# Patient Record
Sex: FEMALE | Race: WHITE | NOT HISPANIC OR LATINO | Employment: OTHER | URBAN - METROPOLITAN AREA
[De-identification: names, ages, dates, MRNs, and addresses within clinical notes are randomized per-mention and may not be internally consistent; named-entity substitution may affect disease eponyms.]

---

## 2023-05-16 LAB
ALANINE AMINOTRANSFERASE (SGPT) (U/L) IN SER/PLAS: 14 U/L (ref 7–45)
ALBUMIN (G/DL) IN SER/PLAS: 3.8 G/DL (ref 3.4–5)
ALKALINE PHOSPHATASE (U/L) IN SER/PLAS: 64 U/L (ref 33–136)
ANION GAP IN SER/PLAS: 10 MMOL/L (ref 10–20)
ASPARTATE AMINOTRANSFERASE (SGOT) (U/L) IN SER/PLAS: 17 U/L (ref 9–39)
BILIRUBIN TOTAL (MG/DL) IN SER/PLAS: 0.7 MG/DL (ref 0–1.2)
CALCIUM (MG/DL) IN SER/PLAS: 8.9 MG/DL (ref 8.6–10.3)
CARBON DIOXIDE, TOTAL (MMOL/L) IN SER/PLAS: 29 MMOL/L (ref 21–32)
CHLORIDE (MMOL/L) IN SER/PLAS: 103 MMOL/L (ref 98–107)
CREATININE (MG/DL) IN SER/PLAS: 0.78 MG/DL (ref 0.5–1.05)
ESTIMATED AVERAGE GLUCOSE FOR HBA1C: 123 MG/DL
GFR FEMALE: 82 ML/MIN/1.73M2
GLUCOSE (MG/DL) IN SER/PLAS: 93 MG/DL (ref 74–99)
HEMOGLOBIN A1C/HEMOGLOBIN TOTAL IN BLOOD: 5.9 %
POTASSIUM (MMOL/L) IN SER/PLAS: 4.5 MMOL/L (ref 3.5–5.3)
PROTEIN TOTAL: 6.8 G/DL (ref 6.4–8.2)
SODIUM (MMOL/L) IN SER/PLAS: 137 MMOL/L (ref 136–145)
UREA NITROGEN (MG/DL) IN SER/PLAS: 18 MG/DL (ref 6–23)

## 2023-05-23 ENCOUNTER — OFFICE VISIT (OUTPATIENT)
Dept: PRIMARY CARE | Facility: CLINIC | Age: 68
End: 2023-05-23
Payer: MEDICARE

## 2023-05-23 VITALS
WEIGHT: 137 LBS | TEMPERATURE: 97.1 F | HEART RATE: 80 BPM | OXYGEN SATURATION: 98 % | SYSTOLIC BLOOD PRESSURE: 140 MMHG | HEIGHT: 64 IN | DIASTOLIC BLOOD PRESSURE: 80 MMHG | BODY MASS INDEX: 23.39 KG/M2

## 2023-05-23 DIAGNOSIS — R73.03 PREDIABETES: ICD-10-CM

## 2023-05-23 DIAGNOSIS — Z00.00 ROUTINE GENERAL MEDICAL EXAMINATION AT HEALTH CARE FACILITY: Primary | ICD-10-CM

## 2023-05-23 DIAGNOSIS — R59.9 ENLARGED LYMPH NODES, UNSPECIFIED: ICD-10-CM

## 2023-05-23 DIAGNOSIS — N18.2 CKD (CHRONIC KIDNEY DISEASE) STAGE 2, GFR 60-89 ML/MIN: ICD-10-CM

## 2023-05-23 PROCEDURE — G0439 PPPS, SUBSEQ VISIT: HCPCS | Performed by: FAMILY MEDICINE

## 2023-05-23 PROCEDURE — 99397 PER PM REEVAL EST PAT 65+ YR: CPT | Performed by: FAMILY MEDICINE

## 2023-05-23 PROCEDURE — 1170F FXNL STATUS ASSESSED: CPT | Performed by: FAMILY MEDICINE

## 2023-05-23 PROCEDURE — G0444 DEPRESSION SCREEN ANNUAL: HCPCS | Performed by: FAMILY MEDICINE

## 2023-05-23 PROCEDURE — 1036F TOBACCO NON-USER: CPT | Performed by: FAMILY MEDICINE

## 2023-05-23 PROCEDURE — 1160F RVW MEDS BY RX/DR IN RCRD: CPT | Performed by: FAMILY MEDICINE

## 2023-05-23 PROCEDURE — G0446 INTENS BEHAVE THER CARDIO DX: HCPCS | Performed by: FAMILY MEDICINE

## 2023-05-23 PROCEDURE — 99214 OFFICE O/P EST MOD 30 MIN: CPT | Performed by: FAMILY MEDICINE

## 2023-05-23 PROCEDURE — 1159F MED LIST DOCD IN RCRD: CPT | Performed by: FAMILY MEDICINE

## 2023-05-23 RX ORDER — LATANOPROST 50 UG/ML
SOLUTION/ DROPS OPHTHALMIC
COMMUNITY

## 2023-05-23 RX ORDER — BIMATOPROST 0.3 MG/ML
SOLUTION/ DROPS OPHTHALMIC
COMMUNITY
Start: 2020-11-19 | End: 2023-11-20 | Stop reason: WASHOUT

## 2023-05-23 RX ORDER — ACETAMINOPHEN 500 MG
1 TABLET ORAL DAILY
COMMUNITY

## 2023-05-23 RX ORDER — HYDROCORTISONE 25 MG/G
CREAM TOPICAL
COMMUNITY
Start: 2022-08-09 | End: 2024-03-07 | Stop reason: WASHOUT

## 2023-05-23 RX ORDER — TIMOLOL MALEATE 5 MG/ML
SOLUTION/ DROPS OPHTHALMIC
COMMUNITY
Start: 2023-04-21 | End: 2023-11-20 | Stop reason: WASHOUT

## 2023-05-23 RX ORDER — LORATADINE 10 MG/1
TABLET ORAL
COMMUNITY

## 2023-05-23 ASSESSMENT — ENCOUNTER SYMPTOMS
VOMITING: 0
POLYDIPSIA: 0
CONSTIPATION: 0
DIZZINESS: 0
ARTHRALGIAS: 0
SHORTNESS OF BREATH: 0
SLEEP DISTURBANCE: 0
PALPITATIONS: 0
DYSURIA: 0
POLYPHAGIA: 0
DYSPHORIC MOOD: 0
FATIGUE: 0
DIFFICULTY URINATING: 0
BLOOD IN STOOL: 0
ABDOMINAL DISTENTION: 0
ABDOMINAL PAIN: 0
NAUSEA: 0
HEADACHES: 0
DIARRHEA: 0
MYALGIAS: 0

## 2023-05-23 ASSESSMENT — PATIENT HEALTH QUESTIONNAIRE - PHQ9
2. FEELING DOWN, DEPRESSED OR HOPELESS: NOT AT ALL
1. LITTLE INTEREST OR PLEASURE IN DOING THINGS: NOT AT ALL
SUM OF ALL RESPONSES TO PHQ9 QUESTIONS 1 AND 2: 0

## 2023-05-23 ASSESSMENT — ACTIVITIES OF DAILY LIVING (ADL)
TAKING_MEDICATION: INDEPENDENT
DRESSING: INDEPENDENT
BATHING: INDEPENDENT
DOING_HOUSEWORK: INDEPENDENT
GROCERY_SHOPPING: INDEPENDENT
MANAGING_FINANCES: INDEPENDENT

## 2023-05-23 NOTE — PROGRESS NOTES
"Subjective   Reason for Visit: Tere Rod is an 67 y.o. female here for a Medicare Wellness visit, CPE and multiple issues     Past Medical, Surgical, and Family History reviewed and updated in chart.         HPI    Patient Care Team:  Zenia Carney DO as PCP - General  Zenia Carney DO as PCP - [employer] PCP    Surgery: Dr. Mathur    Predm: stable. A1c 5.9%.   BMI: nml.   LAD: Reports intermittent swelling in the neck.  Occasionally feels fullness throat but denies GERD symptoms.  No recent illness.  No associated fever/chills/weight change/appetite change.  CKD: stable. Cr nml, GFR 82.     Review of Systems   Constitutional:  Negative for fatigue.   HENT: Negative.     Eyes:  Negative for visual disturbance.   Respiratory:  Negative for shortness of breath.    Cardiovascular:  Negative for chest pain and palpitations.   Gastrointestinal:  Negative for abdominal distention, abdominal pain, blood in stool, constipation, diarrhea, nausea and vomiting.   Endocrine: Negative for cold intolerance, heat intolerance, polydipsia, polyphagia and polyuria.   Genitourinary:  Negative for difficulty urinating and dysuria.   Musculoskeletal:  Negative for arthralgias and myalgias.   Skin:  Negative for rash.   Neurological:  Negative for dizziness and headaches.   Psychiatric/Behavioral:  Negative for dysphoric mood and sleep disturbance.        Objective   Vitals:  /80   Pulse 80   Temp 36.2 °C (97.1 °F)   Ht 1.626 m (5' 4\")   Wt 62.1 kg (137 lb)   SpO2 98%   BMI 23.52 kg/m²       Physical Exam  Vitals and nursing note reviewed.   Constitutional:       General: She is not in acute distress.     Appearance: Normal appearance. She is not toxic-appearing or diaphoretic.   HENT:      Head: Normocephalic.      Right Ear: Tympanic membrane normal.      Left Ear: Tympanic membrane normal.      Nose: Nose normal.      Mouth/Throat:      Pharynx: Oropharynx is clear.   Eyes:      General: No scleral " icterus.     Pupils: Pupils are equal, round, and reactive to light.   Neck:      Vascular: No carotid bruit.   Cardiovascular:      Rate and Rhythm: Normal rate and regular rhythm.      Pulses: Normal pulses.      Heart sounds: No murmur heard.  Pulmonary:      Effort: Pulmonary effort is normal. No respiratory distress.      Breath sounds: Normal breath sounds.   Abdominal:      General: Bowel sounds are normal.      Palpations: Abdomen is soft.      Tenderness: There is no abdominal tenderness. There is no guarding.   Musculoskeletal:      Cervical back: Neck supple.      Right lower leg: No edema.      Left lower leg: No edema.   Lymphadenopathy:      Head:      Right side of head: No posterior auricular or occipital adenopathy.      Left side of head: No posterior auricular or occipital adenopathy.      Cervical: No cervical adenopathy.      Right cervical: No superficial or posterior cervical adenopathy.     Left cervical: No superficial or posterior cervical adenopathy.      Upper Body:      Right upper body: No supraclavicular adenopathy.      Left upper body: No supraclavicular adenopathy.   Skin:     General: Skin is warm.   Neurological:      General: No focal deficit present.      Mental Status: She is alert.      Cranial Nerves: No cranial nerve deficit.   Psychiatric:         Mood and Affect: Mood normal.         Assessment/Plan   Problem List Items Addressed This Visit    None  Visit Diagnoses       Routine general medical examination at health care facility    -  Primary    Relevant Orders    Lipid Panel    Prediabetes        Relevant Orders    Comprehensive Metabolic Panel    Hemoglobin A1C    Enlarged lymph nodes, unspecified        Relevant Orders    CBC and Auto Differential    CKD (chronic kidney disease) stage 2, GFR 60-89 ml/min            Discussed blood work and wellness issues.  Reviewed screening    ASCVD risk counseling: Discussed risk of 9%.  Reviewed prior laboratory work.  Patient  declines statin.  Aspirin not indicated at this time.  Discussed lifestyle changes to improve cardiovascular risk.  Patient would like to continue to work on lifestyle and consider statin at next visit if lipids still high    Colonoscopy: done 1/4/23.

## 2023-05-23 NOTE — PATIENT INSTRUCTIONS
Recommend a predominant whole foods plant based diet.  Cut back on meat, dairy, salt and oils. Increase fiber in your diet.  Decrease alcohol as much as possible if you drink. Recommend regular exercise most days of the week.    Recommend shingrix vaccine at the pharmacy    Follow up in 3 months with repeat blood work, sooner if needed

## 2023-05-31 ENCOUNTER — TELEPHONE (OUTPATIENT)
Dept: PRIMARY CARE | Facility: CLINIC | Age: 68
End: 2023-05-31
Payer: MEDICARE

## 2023-05-31 NOTE — TELEPHONE ENCOUNTER
Patient states she tried to make an appt and [facility] does not have an opening until June 26. The Lymph Nodules are back down again. Her Fit Bit showed her Heart Rate she was in Afib and her BP has been 88/47,85/58. Please Advise

## 2023-08-15 ENCOUNTER — LAB (OUTPATIENT)
Dept: LAB | Facility: LAB | Age: 68
End: 2023-08-15
Payer: MEDICARE

## 2023-08-15 DIAGNOSIS — R59.9 ENLARGED LYMPH NODES, UNSPECIFIED: ICD-10-CM

## 2023-08-15 LAB
BASOPHILS (10*3/UL) IN BLOOD BY AUTOMATED COUNT: 0.04 X10E9/L (ref 0–0.1)
BASOPHILS/100 LEUKOCYTES IN BLOOD BY AUTOMATED COUNT: 0.9 % (ref 0–2)
EOSINOPHILS (10*3/UL) IN BLOOD BY AUTOMATED COUNT: 0.09 X10E9/L (ref 0–0.7)
EOSINOPHILS/100 LEUKOCYTES IN BLOOD BY AUTOMATED COUNT: 2 % (ref 0–6)
ERYTHROCYTE DISTRIBUTION WIDTH (RATIO) BY AUTOMATED COUNT: 12.8 % (ref 11.5–14.5)
ERYTHROCYTE MEAN CORPUSCULAR HEMOGLOBIN CONCENTRATION (G/DL) BY AUTOMATED: 33.2 G/DL (ref 32–36)
ERYTHROCYTE MEAN CORPUSCULAR VOLUME (FL) BY AUTOMATED COUNT: 92 FL (ref 80–100)
ERYTHROCYTES (10*6/UL) IN BLOOD BY AUTOMATED COUNT: 4.42 X10E12/L (ref 4–5.2)
HEMATOCRIT (%) IN BLOOD BY AUTOMATED COUNT: 40.7 % (ref 36–46)
HEMOGLOBIN (G/DL) IN BLOOD: 13.5 G/DL (ref 12–16)
IMMATURE GRANULOCYTES/100 LEUKOCYTES IN BLOOD BY AUTOMATED COUNT: 0.2 % (ref 0–0.9)
LEUKOCYTES (10*3/UL) IN BLOOD BY AUTOMATED COUNT: 4.6 X10E9/L (ref 4.4–11.3)
LYMPHOCYTES (10*3/UL) IN BLOOD BY AUTOMATED COUNT: 1.42 X10E9/L (ref 1.2–4.8)
LYMPHOCYTES/100 LEUKOCYTES IN BLOOD BY AUTOMATED COUNT: 31.1 % (ref 13–44)
MONOCYTES (10*3/UL) IN BLOOD BY AUTOMATED COUNT: 0.36 X10E9/L (ref 0.1–1)
MONOCYTES/100 LEUKOCYTES IN BLOOD BY AUTOMATED COUNT: 7.9 % (ref 2–10)
NEUTROPHILS (10*3/UL) IN BLOOD BY AUTOMATED COUNT: 2.64 X10E9/L (ref 1.2–7.7)
NEUTROPHILS/100 LEUKOCYTES IN BLOOD BY AUTOMATED COUNT: 57.9 % (ref 40–80)
PLATELETS (10*3/UL) IN BLOOD AUTOMATED COUNT: 268 X10E9/L (ref 150–450)

## 2023-08-15 PROCEDURE — 85025 COMPLETE CBC W/AUTO DIFF WBC: CPT

## 2023-08-15 PROCEDURE — 36415 COLL VENOUS BLD VENIPUNCTURE: CPT

## 2023-08-23 ENCOUNTER — TELEPHONE (OUTPATIENT)
Dept: PRIMARY CARE | Facility: CLINIC | Age: 68
End: 2023-08-23

## 2023-08-23 ENCOUNTER — OFFICE VISIT (OUTPATIENT)
Dept: PRIMARY CARE | Facility: CLINIC | Age: 68
End: 2023-08-23
Payer: MEDICARE

## 2023-08-23 VITALS
HEART RATE: 72 BPM | DIASTOLIC BLOOD PRESSURE: 80 MMHG | OXYGEN SATURATION: 98 % | BODY MASS INDEX: 21.97 KG/M2 | TEMPERATURE: 97.4 F | SYSTOLIC BLOOD PRESSURE: 122 MMHG | WEIGHT: 128 LBS

## 2023-08-23 DIAGNOSIS — R73.03 PREDIABETES: ICD-10-CM

## 2023-08-23 DIAGNOSIS — E04.2 MULTIPLE THYROID NODULES: ICD-10-CM

## 2023-08-23 DIAGNOSIS — R07.9 CHEST PAIN, UNSPECIFIED TYPE: ICD-10-CM

## 2023-08-23 DIAGNOSIS — K21.9 GASTROESOPHAGEAL REFLUX DISEASE WITHOUT ESOPHAGITIS: Primary | ICD-10-CM

## 2023-08-23 DIAGNOSIS — F43.22 ADJUSTMENT DISORDER WITH ANXIETY: ICD-10-CM

## 2023-08-23 PROBLEM — E55.9 VITAMIN D DEFICIENCY: Status: ACTIVE | Noted: 2023-08-23

## 2023-08-23 PROBLEM — J30.9 ALLERGIC RHINITIS: Status: ACTIVE | Noted: 2018-10-10

## 2023-08-23 PROBLEM — J37.0 CHRONIC LARYNGITIS: Status: ACTIVE | Noted: 2023-08-02

## 2023-08-23 PROBLEM — E53.8 VITAMIN B12 DEFICIENCY: Status: ACTIVE | Noted: 2023-08-23

## 2023-08-23 PROCEDURE — 1126F AMNT PAIN NOTED NONE PRSNT: CPT | Performed by: FAMILY MEDICINE

## 2023-08-23 PROCEDURE — 1036F TOBACCO NON-USER: CPT | Performed by: FAMILY MEDICINE

## 2023-08-23 PROCEDURE — 99214 OFFICE O/P EST MOD 30 MIN: CPT | Performed by: FAMILY MEDICINE

## 2023-08-23 PROCEDURE — 93000 ELECTROCARDIOGRAM COMPLETE: CPT | Performed by: FAMILY MEDICINE

## 2023-08-23 PROCEDURE — 1160F RVW MEDS BY RX/DR IN RCRD: CPT | Performed by: FAMILY MEDICINE

## 2023-08-23 PROCEDURE — 1159F MED LIST DOCD IN RCRD: CPT | Performed by: FAMILY MEDICINE

## 2023-08-23 RX ORDER — ESTRADIOL 0.1 MG/G
CREAM VAGINAL
COMMUNITY
Start: 2021-01-05 | End: 2023-11-28 | Stop reason: SDUPTHER

## 2023-08-23 RX ORDER — SERTRALINE HYDROCHLORIDE 50 MG/1
50 TABLET, FILM COATED ORAL DAILY
Qty: 90 TABLET | Refills: 0 | Status: SHIPPED | OUTPATIENT
Start: 2023-08-23 | End: 2023-11-20 | Stop reason: WASHOUT

## 2023-08-23 RX ORDER — OMEPRAZOLE 40 MG/1
40 CAPSULE, DELAYED RELEASE ORAL DAILY
COMMUNITY

## 2023-08-23 RX ORDER — HYDROXYZINE HYDROCHLORIDE 10 MG/1
10 TABLET, FILM COATED ORAL 2 TIMES DAILY PRN
Qty: 30 TABLET | Refills: 0 | Status: SHIPPED | OUTPATIENT
Start: 2023-08-23 | End: 2023-09-08 | Stop reason: SDUPTHER

## 2023-08-23 ASSESSMENT — ENCOUNTER SYMPTOMS
DYSPHORIC MOOD: 0
SHORTNESS OF BREATH: 0
SLEEP DISTURBANCE: 0
HEADACHES: 0
POLYDIPSIA: 0
DIFFICULTY URINATING: 0
ARTHRALGIAS: 0
FATIGUE: 0
MYALGIAS: 0
CONSTIPATION: 0
POLYPHAGIA: 0
DIZZINESS: 0
VOMITING: 0
NAUSEA: 0
DIARRHEA: 0
ABDOMINAL PAIN: 0
DYSURIA: 0

## 2023-08-23 NOTE — PATIENT INSTRUCTIONS
Obtain blood (thyroid blood work)    Start new meds. Stop if having significant side effects. Continue your other medications    Follow up with your specialists as scheduled    Go to the ER if any of your symptoms are significantly worsening.     Follow up in 3 months, sooner if needed

## 2023-08-23 NOTE — PROGRESS NOTES
Subjective   Patient ID: Tere Rod is a 67 y.o. female who presents for GERD and Prediabetes and multiple issues    GERD  She reports no abdominal pain or no nausea. Pertinent negatives include no fatigue.      MNG: saw ENT after last ov. Scope showed chronic reflux. US ordered and showed multiple small nodules. Radiology recommended TFTs    GERD: has pain and fullness in neck. Saw ENT as above. Placed on PPI. Sig improved.    Mood: feeling more anxious. Treated for anxiety in past at age 38 w/ prozac. On edge and occ overwhelmed. Sleeping ok. Mood affecting daily life. Would like to start med    CP: occ pressure in chest. Random. Mostly when moving around. Occ flutter but brief. No radiation of pain    Review of Systems   Constitutional:  Negative for fatigue.   HENT:          As above   Eyes:  Negative for visual disturbance.   Respiratory:  Negative for shortness of breath.    Cardiovascular:         As above   Gastrointestinal:  Negative for abdominal pain, constipation, diarrhea, nausea and vomiting.   Endocrine: Negative for polydipsia, polyphagia and polyuria.   Genitourinary:  Negative for difficulty urinating and dysuria.   Musculoskeletal:  Negative for arthralgias and myalgias.   Skin:  Negative for rash.   Neurological:  Negative for dizziness and headaches.   Psychiatric/Behavioral:  Negative for dysphoric mood and sleep disturbance.        Objective   /80   Pulse 72   Temp 36.3 °C (97.4 °F)   Wt 58.1 kg (128 lb)   SpO2 98%   BMI 21.97 kg/m²     Physical Exam  Vitals and nursing note reviewed.   Constitutional:       General: She is not in acute distress.     Appearance: Normal appearance. She is not toxic-appearing.   HENT:      Head: Normocephalic.   Eyes:      Pupils: Pupils are equal, round, and reactive to light.   Cardiovascular:      Rate and Rhythm: Normal rate and regular rhythm.      Pulses: Normal pulses.      Heart sounds: No murmur heard.  Pulmonary:      Effort: Pulmonary  effort is normal. No respiratory distress.      Breath sounds: Normal breath sounds.   Abdominal:      General: Bowel sounds are normal.      Palpations: Abdomen is soft.      Tenderness: There is no abdominal tenderness. There is no guarding.   Musculoskeletal:      Right lower leg: No edema.      Left lower leg: No edema.   Skin:     General: Skin is warm.   Neurological:      General: No focal deficit present.      Mental Status: She is alert.      Cranial Nerves: No cranial nerve deficit.   Psychiatric:         Mood and Affect: Mood normal.       Assessment/Plan   Problem List Items Addressed This Visit          Endocrine/Metabolic    Multiple thyroid nodules    Relevant Orders    TSH with reflex to Free T4 if abnormal    Prediabetes       Gastrointestinal and Abdominal    Gastroesophageal reflux disease without esophagitis - Primary     Other Visit Diagnoses       Adjustment disorder with anxiety        Chest pain, unspecified type        Relevant Orders    ECG 12 lead        Reviewed consults, bw and imaging.

## 2023-08-29 ENCOUNTER — LAB (OUTPATIENT)
Dept: LAB | Facility: LAB | Age: 68
End: 2023-08-29
Payer: MEDICARE

## 2023-08-29 DIAGNOSIS — E04.2 MULTIPLE THYROID NODULES: ICD-10-CM

## 2023-08-29 LAB — THYROTROPIN (MIU/L) IN SER/PLAS BY DETECTION LIMIT <= 0.05 MIU/L: 2.28 MIU/L (ref 0.44–3.98)

## 2023-08-29 PROCEDURE — 36415 COLL VENOUS BLD VENIPUNCTURE: CPT

## 2023-08-29 PROCEDURE — 84443 ASSAY THYROID STIM HORMONE: CPT

## 2023-09-05 ENCOUNTER — TELEPHONE (OUTPATIENT)
Dept: PRIMARY CARE | Facility: CLINIC | Age: 68
End: 2023-09-05
Payer: MEDICARE

## 2023-09-08 DIAGNOSIS — F43.22 ADJUSTMENT DISORDER WITH ANXIETY: ICD-10-CM

## 2023-09-08 RX ORDER — HYDROXYZINE HYDROCHLORIDE 10 MG/1
10 TABLET, FILM COATED ORAL 2 TIMES DAILY PRN
Qty: 60 TABLET | Refills: 0 | Status: SHIPPED | OUTPATIENT
Start: 2023-09-08 | End: 2023-11-28 | Stop reason: SDUPTHER

## 2023-09-08 NOTE — TELEPHONE ENCOUNTER
Pt called rx line at 925 requesting a refill on pended med.  Pt last RX was sent on 8/23 for a QTY of 30.  Pt is asking if a 90 day supply of med could be sent to [facility] (in EMR).  Please advise? Thanks, CG

## 2023-11-18 DIAGNOSIS — F43.22 ADJUSTMENT DISORDER WITH ANXIETY: ICD-10-CM

## 2023-11-20 ENCOUNTER — OFFICE VISIT (OUTPATIENT)
Dept: GASTROENTEROLOGY | Facility: CLINIC | Age: 68
End: 2023-11-20
Payer: MEDICARE

## 2023-11-20 VITALS
BODY MASS INDEX: 23.21 KG/M2 | HEIGHT: 63 IN | DIASTOLIC BLOOD PRESSURE: 84 MMHG | SYSTOLIC BLOOD PRESSURE: 138 MMHG | HEART RATE: 72 BPM | WEIGHT: 131 LBS

## 2023-11-20 DIAGNOSIS — K21.9 GASTROESOPHAGEAL REFLUX DISEASE WITHOUT ESOPHAGITIS: Primary | ICD-10-CM

## 2023-11-20 PROCEDURE — 1036F TOBACCO NON-USER: CPT | Performed by: INTERNAL MEDICINE

## 2023-11-20 PROCEDURE — 1160F RVW MEDS BY RX/DR IN RCRD: CPT | Performed by: INTERNAL MEDICINE

## 2023-11-20 PROCEDURE — 99204 OFFICE O/P NEW MOD 45 MIN: CPT | Performed by: INTERNAL MEDICINE

## 2023-11-20 PROCEDURE — 1126F AMNT PAIN NOTED NONE PRSNT: CPT | Performed by: INTERNAL MEDICINE

## 2023-11-20 PROCEDURE — 1159F MED LIST DOCD IN RCRD: CPT | Performed by: INTERNAL MEDICINE

## 2023-11-20 ASSESSMENT — ENCOUNTER SYMPTOMS
SHORTNESS OF BREATH: 0
ARTHRALGIAS: 0
ADENOPATHY: 0
NUMBNESS: 0
WHEEZING: 0
CHILLS: 0
BRUISES/BLEEDS EASILY: 0
TROUBLE SWALLOWING: 0
WEAKNESS: 0
PALPITATIONS: 0
SORE THROAT: 0
COUGH: 0
DYSURIA: 0
VOICE CHANGE: 0
UNEXPECTED WEIGHT CHANGE: 0
NERVOUS/ANXIOUS: 1
MYALGIAS: 0
FEVER: 0
FATIGUE: 0
CONFUSION: 0
SEIZURES: 0
HEADACHES: 0
HEMATURIA: 0
DIZZINESS: 0
ROS GI COMMENTS: AS DETAILED ABOVE.

## 2023-11-20 NOTE — LETTER
November 20, 2023     Silke Christianson MD  [address]  [address]  [place] [address]    Patient: Tere Rod   YOB: 1955   Date of Visit: 11/20/2023       Dear Dr. Silke Christianson MD:    Thank you for referring Tere Rod to me for evaluation. Below are my notes for this consultation.  If you have questions, please do not hesitate to call me. I look forward to following your patient along with you.       Sincerely,     Attila Kramer MD      CC: Zenia Carney DO  ______________________________________________________________________________________        [facility]    ASSESSMENT and PLAN:       Tere Rod is a 68 y.o. female with a significant past medical history of prediabetes and GERD who presents for consultation requested by ENT (Dr. Silke Christianson) for the evaluation of GERD.       GERD  Longstanding symptoms of GERD with some worsening recently as well as dysphagia. She is doing better on a PPI. Imaging (XR esophagram) shows no stenosis or stricture. C5 osteophyte may be contributing to symptoms of dysphagia. There is a family history of esophageal cancer (and likely BE). Given dysphagia and family history will plan for EGD at this time.  - continue PPI  - EGD scheduled in endo      Follow up in 6 months.        Attila Kramer MD        Gastroenterology    [facility] [place] [facility]    Clinical   [facility]        Subjective  HISTORY OF PRESENT ILLNESS:     Chief Complaint  GERD    History Of Present Illness:    Tere Rod is a 68 y.o. female with a significant past medical history of prediabetes and GERD who presents for consultation requested by ENT (Dr. Silke Christianson) for the evaluation of GERD.    She follows with Zenia Carney DO as her PCP.       Her medication list includes Prilosec (40 mg daily).    She was previously seen by ENT (only limited records  available to review) and then seen by her PCP who reported that ENT “scope showed chronic reflux”.    Labs have shown a normal CBC, CMP, and TSH.    XR esophagram on 8/17/2023 was normal except for a small hiatal hernia. MBBS did show a prominent cricopharyngeal impression at C5.      Today she says that she saw Dr. Christianson because of difficulty swallowing. He had started her on Omeprzole and with that she has noticed that her symptoms are 95-98% better.    Overall she has had issues with heartburn for years with burning in her chest and regurgitation/sour taste in her mouth. In the past this had been intermittent, but had worsened recently. In the past lifestyle changes had helped.    Prior to seeing Dr. Christianson she was having dysphagia with solids/pills. She noticed difficulty starting a swallow and a sensation of food sitting in the back of her mouth. Once she started a swallow she did not notice any sticking/stopping.    She has been taking Omeprazole daily (after dinner).    She says that her last EGD was about 8 years ago and she thinks it was normal.      Patient denies any N/V, odynophagia, abdominal pain, diarrhea, constipation, hematemesis, hematochezia, melena, or weight loss.      Review of systems:   Review of Systems   Constitutional:  Negative for chills, fatigue, fever and unexpected weight change.   HENT:  Negative for congestion, sneezing, sore throat, trouble swallowing and voice change.    Respiratory:  Negative for cough, shortness of breath and wheezing.    Cardiovascular:  Negative for chest pain, palpitations and leg swelling.   Gastrointestinal:         As detailed above.   Genitourinary:  Negative for dysuria and hematuria.   Musculoskeletal:  Negative for arthralgias and myalgias.   Skin:  Negative for pallor and rash.   Neurological:  Negative for dizziness, seizures, syncope, weakness, numbness and headaches.   Hematological:  Negative for adenopathy. Does not bruise/bleed easily.    Psychiatric/Behavioral:  Negative for confusion. The patient is nervous/anxious.    All other systems reviewed and are negative.      I performed a complete 10 point review of systems and it is negative except as noted in HPI or above.      PAST HISTORIES:       Past Medical History:  She has a past medical history of Cystocele, unspecified (CODE) (02/05/2019), Encounter for follow-up examination after completed treatment for conditions other than malignant neoplasm (09/29/2020), Other conditions influencing health status (09/29/2020), Personal history of cervical dysplasia (11/19/2020), Personal history of gestational diabetes (10/05/2020), Personal history of other (healed) physical injury and trauma (01/24/2019), Personal history of other diseases of urinary system, Personal history of other specified conditions (01/22/2020), Urinary tract infection, site not specified (08/25/2020), and Uterovaginal prolapse, unspecified (09/29/2020).    Past Surgical History:  She has a past surgical history that includes Other surgical history (12/27/2018); Other surgical history (12/27/2018); Other surgical history (11/22/2021); Other surgical history (09/02/2020); Other surgical history (09/02/2020); and Other surgical history (09/02/2020).      Social History:  She reports that she has never smoked. She has never used smokeless tobacco. She reports that she does not currently use alcohol. She reports that she does not use drugs.    Family History:  She reports that her father had esophageal cancer. She thinks that he may have had Daigle's esophagus initially.       Family History   Problem Relation Name Age of Onset   • Diabetes Mother     • Hypertension Mother     • Colon cancer Mother     • Diabetes Father     • Hypertension Father     • Coronary artery disease Father     • Heart attack Father     • Esophageal cancer Father     • Diabetes Brother     • Hypertension Brother         "  Allergies:  Other      Objective  OBJECTIVE:       Last Recorded Vitals:  Vitals:    11/20/23 1535   BP: 138/84   Pulse: 72   Weight: 59.4 kg (131 lb)   Height: 1.6 m (5' 3\")     /84   Pulse 72   Ht 1.6 m (5' 3\")   Wt 59.4 kg (131 lb)   BMI 23.21 kg/m²      Physical Exam:    Physical Exam  Vitals reviewed.   Constitutional:       General: She is not in acute distress.     Appearance: She is not ill-appearing.   HENT:      Head: Normocephalic and atraumatic.   Eyes:      General: No scleral icterus.  Cardiovascular:      Rate and Rhythm: Normal rate and regular rhythm.      Pulses: Normal pulses.      Heart sounds: Normal heart sounds. No murmur heard.  Pulmonary:      Effort: Pulmonary effort is normal. No respiratory distress.      Breath sounds: Normal breath sounds. No wheezing.   Abdominal:      General: Bowel sounds are normal.      Palpations: Abdomen is soft.      Tenderness: There is no abdominal tenderness. There is no rebound.   Musculoskeletal:         General: No swelling or deformity.   Skin:     General: Skin is warm and dry.      Coloration: Skin is not jaundiced.      Findings: No rash.   Neurological:      General: No focal deficit present.      Mental Status: She is alert and oriented to person, place, and time.   Psychiatric:         Mood and Affect: Mood normal.         Behavior: Behavior normal.         Thought Content: Thought content normal.         Judgment: Judgment normal.         Home Medications:  Prior to Admission medications    Medication Sig Start Date End Date Taking? Authorizing Provider   Anusol-HC 2.5 % rectal cream Apply topically. 8/9/22   Historical Provider, MD   bimatoprost (Lumigan) 0.03 % ophthalmic solution Administer into affected eye(s). 11/19/20   Historical Provider, MD   cholecalciferol (Vitamin D-3) 5,000 Units tablet Take 1 tablet (5,000 Units) by mouth once daily.    Historical Provider, MD   estradiol (Estrace) 0.01 % (0.1 mg/gram) vaginal cream PLACE " "DIME SIZED DROP OF CREAM VAGINALLY NIGHTLY FOR 2 WEEKS, AND THEN 3 TIMES WEEKLY THEREAFTER 1/5/21   Historical Provider, MD   hydrOXYzine HCL (Atarax) 10 mg tablet Take 1 tablet (10 mg) by mouth 2 times a day as needed for anxiety. 9/8/23 11/7/23  Zenia Carney, DO   latanoprost (Xalatan) 0.005 % ophthalmic solution INSTILL 1 DROP INTO BOTH EYES IN THE EVENING    Historical Provider, MD   loratadine (Claritin) 10 mg tablet Take by mouth.    Historical Provider, MD   omeprazole (PriLOSEC) 40 mg DR capsule Take 1 capsule (40 mg) by mouth early in the morning.. Do not crush or chew.    Historical Provider, MD   sertraline (Zoloft) 50 mg tablet Take 1 tablet (50 mg) by mouth once daily. 8/23/23 11/21/23  Zenia Carney, DO   timolol (Timoptic) 0.5 % ophthalmic solution INSTILL 1 DROP INTO BOTH EYES EVERY MORNING 4/21/23   Historical Provider, MD         Relevant Results Recent labs reviewed in the EMR.  Lab Results   Component Value Date    HGB 13.5 08/15/2023    HGB 14.1 08/05/2020    MCV 92 08/15/2023    MCV 90 08/05/2020     08/15/2023     08/05/2020       No results found for: \"FERRITIN\", \"IRON\"    Lab Results   Component Value Date     05/16/2023    K 4.5 05/16/2023     05/16/2023    BUN 18 05/16/2023    CREATININE 0.78 05/16/2023       Lab Results   Component Value Date    BILITOT 0.7 05/16/2023     Lab Results   Component Value Date    ALT 14 05/16/2023    ALT 15 11/18/2021    ALT 9 11/14/2020    AST 17 05/16/2023    AST 17 11/18/2021    AST 12 11/14/2020    ALKPHOS 64 05/16/2023    ALKPHOS 68 11/18/2021    ALKPHOS 87 11/14/2020       No results found for: \"CRP\"    No results found for: \"CALPS\"    Radiology: Reviewed imaging reviewed in the EMR.  No results found.      "

## 2023-11-20 NOTE — PROGRESS NOTES
[facility] Gastroenterology    ASSESSMENT and PLAN:       Tere Rod is a 68 y.o. female with a significant past medical history of prediabetes and GERD who presents for consultation requested by ENT (Dr. Silke Christianson) for the evaluation of GERD.       GERD  Longstanding symptoms of GERD with some worsening recently as well as dysphagia. She is doing better on a PPI. Imaging (XR esophagram) shows no stenosis or stricture. C5 osteophyte may be contributing to symptoms of dysphagia. There is a family history of esophageal cancer (and likely BE). Given dysphagia and family history will plan for EGD at this time.  - continue PPI  - EGD scheduled in endo      Follow up in 6 months.        Attila Kramer MD        Gastroenterology    [facility]    Clinical   [facility]        Subjective   HISTORY OF PRESENT ILLNESS:     Chief Complaint  GERD    History Of Present Illness:    Tere Rod is a 68 y.o. female with a significant past medical history of prediabetes and GERD who presents for consultation requested by ENT (Dr. Silke Christianson) for the evaluation of GERD.    She follows with Zenia Carney DO as her PCP.       Her medication list includes Prilosec (40 mg daily).    She was previously seen by ENT (only limited records available to review) and then seen by her PCP who reported that ENT “scope showed chronic reflux”.    Labs have shown a normal CBC, CMP, and TSH.    XR esophagram on 8/17/2023 was normal except for a small hiatal hernia. MBBS did show a prominent cricopharyngeal impression at C5.      Today she says that she saw Dr. Christianson because of difficulty swallowing. He had started her on Omeprzole and with that she has noticed that her symptoms are 95-98% better.    Overall she has had issues with heartburn for years with burning in her chest and regurgitation/sour taste in her mouth. In the past this had  been intermittent, but had worsened recently. In the past lifestyle changes had helped.    Prior to seeing Dr. Christianson she was having dysphagia with solids/pills. She noticed difficulty starting a swallow and a sensation of food sitting in the back of her mouth. Once she started a swallow she did not notice any sticking/stopping.    She has been taking Omeprazole daily (after dinner).    She says that her last EGD was about 8 years ago and she thinks it was normal.      Patient denies any N/V, odynophagia, abdominal pain, diarrhea, constipation, hematemesis, hematochezia, melena, or weight loss.      Review of systems:   Review of Systems   Constitutional:  Negative for chills, fatigue, fever and unexpected weight change.   HENT:  Negative for congestion, sneezing, sore throat, trouble swallowing and voice change.    Respiratory:  Negative for cough, shortness of breath and wheezing.    Cardiovascular:  Negative for chest pain, palpitations and leg swelling.   Gastrointestinal:         As detailed above.   Genitourinary:  Negative for dysuria and hematuria.   Musculoskeletal:  Negative for arthralgias and myalgias.   Skin:  Negative for pallor and rash.   Neurological:  Negative for dizziness, seizures, syncope, weakness, numbness and headaches.   Hematological:  Negative for adenopathy. Does not bruise/bleed easily.   Psychiatric/Behavioral:  Negative for confusion. The patient is nervous/anxious.    All other systems reviewed and are negative.      I performed a complete 10 point review of systems and it is negative except as noted in HPI or above.      PAST HISTORIES:       Past Medical History:  She has a past medical history of Cystocele, unspecified (CODE) (02/05/2019), Encounter for follow-up examination after completed treatment for conditions other than malignant neoplasm (09/29/2020), Other conditions influencing health status (09/29/2020), Personal history of cervical dysplasia (11/19/2020), Personal  "history of gestational diabetes (10/05/2020), Personal history of other (healed) physical injury and trauma (01/24/2019), Personal history of other diseases of urinary system, Personal history of other specified conditions (01/22/2020), Urinary tract infection, site not specified (08/25/2020), and Uterovaginal prolapse, unspecified (09/29/2020).    Past Surgical History:  She has a past surgical history that includes Other surgical history (12/27/2018); Other surgical history (12/27/2018); Other surgical history (11/22/2021); Other surgical history (09/02/2020); Other surgical history (09/02/2020); and Other surgical history (09/02/2020).      Social History:  She reports that she has never smoked. She has never used smokeless tobacco. She reports that she does not currently use alcohol. She reports that she does not use drugs.    Family History:  She reports that her father had esophageal cancer. She thinks that he may have had Daigle's esophagus initially.       Family History   Problem Relation Name Age of Onset    Diabetes Mother      Hypertension Mother      Colon cancer Mother      Diabetes Father      Hypertension Father      Coronary artery disease Father      Heart attack Father      Esophageal cancer Father      Diabetes Brother      Hypertension Brother          Allergies:  Other      Objective   OBJECTIVE:       Last Recorded Vitals:  Vitals:    11/20/23 1535   BP: 138/84   Pulse: 72   Weight: 59.4 kg (131 lb)   Height: 1.6 m (5' 3\")     /84   Pulse 72   Ht 1.6 m (5' 3\")   Wt 59.4 kg (131 lb)   BMI 23.21 kg/m²      Physical Exam:    Physical Exam  Vitals reviewed.   Constitutional:       General: She is not in acute distress.     Appearance: She is not ill-appearing.   HENT:      Head: Normocephalic and atraumatic.   Eyes:      General: No scleral icterus.  Cardiovascular:      Rate and Rhythm: Normal rate and regular rhythm.      Pulses: Normal pulses.      Heart sounds: Normal heart sounds. " No murmur heard.  Pulmonary:      Effort: Pulmonary effort is normal. No respiratory distress.      Breath sounds: Normal breath sounds. No wheezing.   Abdominal:      General: Bowel sounds are normal.      Palpations: Abdomen is soft.      Tenderness: There is no abdominal tenderness. There is no rebound.   Musculoskeletal:         General: No swelling or deformity.   Skin:     General: Skin is warm and dry.      Coloration: Skin is not jaundiced.      Findings: No rash.   Neurological:      General: No focal deficit present.      Mental Status: She is alert and oriented to person, place, and time.   Psychiatric:         Mood and Affect: Mood normal.         Behavior: Behavior normal.         Thought Content: Thought content normal.         Judgment: Judgment normal.         Home Medications:  Prior to Admission medications    Medication Sig Start Date End Date Taking? Authorizing Provider   Anusol-HC 2.5 % rectal cream Apply topically. 8/9/22   Historical Provider, MD   bimatoprost (Lumigan) 0.03 % ophthalmic solution Administer into affected eye(s). 11/19/20   Historical Provider, MD   cholecalciferol (Vitamin D-3) 5,000 Units tablet Take 1 tablet (5,000 Units) by mouth once daily.    Historical Provider, MD   estradiol (Estrace) 0.01 % (0.1 mg/gram) vaginal cream PLACE DIME SIZED DROP OF CREAM VAGINALLY NIGHTLY FOR 2 WEEKS, AND THEN 3 TIMES WEEKLY THEREAFTER 1/5/21   Historical Provider, MD   hydrOXYzine HCL (Atarax) 10 mg tablet Take 1 tablet (10 mg) by mouth 2 times a day as needed for anxiety. 9/8/23 11/7/23  Zenia Carney, DO   latanoprost (Xalatan) 0.005 % ophthalmic solution INSTILL 1 DROP INTO BOTH EYES IN THE EVENING    Historical Provider, MD   loratadine (Claritin) 10 mg tablet Take by mouth.    Historical Provider, MD   omeprazole (PriLOSEC) 40 mg DR capsule Take 1 capsule (40 mg) by mouth early in the morning.. Do not crush or chew.    Historical Provider, MD   sertraline (Zoloft) 50 mg tablet Take  "1 tablet (50 mg) by mouth once daily. 8/23/23 11/21/23  Zenia Carney DO   timolol (Timoptic) 0.5 % ophthalmic solution INSTILL 1 DROP INTO BOTH EYES EVERY MORNING 4/21/23   Historical Provider, MD         Relevant Results Recent labs reviewed in the EMR.  Lab Results   Component Value Date    HGB 13.5 08/15/2023    HGB 14.1 08/05/2020    MCV 92 08/15/2023    MCV 90 08/05/2020     08/15/2023     08/05/2020       No results found for: \"FERRITIN\", \"IRON\"    Lab Results   Component Value Date     05/16/2023    K 4.5 05/16/2023     05/16/2023    BUN 18 05/16/2023    CREATININE 0.78 05/16/2023       Lab Results   Component Value Date    BILITOT 0.7 05/16/2023     Lab Results   Component Value Date    ALT 14 05/16/2023    ALT 15 11/18/2021    ALT 9 11/14/2020    AST 17 05/16/2023    AST 17 11/18/2021    AST 12 11/14/2020    ALKPHOS 64 05/16/2023    ALKPHOS 68 11/18/2021    ALKPHOS 87 11/14/2020       No results found for: \"CRP\"    No results found for: \"CALPS\"    Radiology: Reviewed imaging reviewed in the EMR.  No results found.      "

## 2023-11-20 NOTE — PATIENT INSTRUCTIONS
Continue taking Omeprazole to block acid in your stomach.  You should take this medication every day.  Take it first thing in the morning about 30 minutes before breakfast.  If you have been prescribed this medicine twice daily you should take the second dose about 30 minutes before dinner.      You have been scheduled for an upper endoscopy (EGD).  You were given instructions for preparing for this test in the office today.  If you have questions about these instructions, please call my office at [phone].    After your procedure, you can expect me to talk to you to go over the results of the procedure.    You were also given information regarding the schedule for your procedure including the time that you need to arrive to the endoscopy unit.  You will also be contacted 2-3 day prior to your procedure to confirm the final arrival time.  If you have questions about this or if you need to cancel or change this appointment please call my office at [phone].      Follow up in 6 months.

## 2023-11-22 ENCOUNTER — LAB (OUTPATIENT)
Dept: LAB | Facility: LAB | Age: 68
End: 2023-11-22
Payer: MEDICARE

## 2023-11-22 DIAGNOSIS — Z00.00 ROUTINE GENERAL MEDICAL EXAMINATION AT HEALTH CARE FACILITY: ICD-10-CM

## 2023-11-22 DIAGNOSIS — R73.03 PREDIABETES: ICD-10-CM

## 2023-11-22 LAB
ALBUMIN SERPL BCP-MCNC: 4.1 G/DL (ref 3.4–5)
ALP SERPL-CCNC: 53 U/L (ref 33–136)
ALT SERPL W P-5'-P-CCNC: 14 U/L (ref 7–45)
ANION GAP SERPL CALC-SCNC: 8 MMOL/L (ref 10–20)
AST SERPL W P-5'-P-CCNC: 16 U/L (ref 9–39)
BILIRUB SERPL-MCNC: 0.8 MG/DL (ref 0–1.2)
BUN SERPL-MCNC: 14 MG/DL (ref 6–23)
CALCIUM SERPL-MCNC: 9.1 MG/DL (ref 8.6–10.3)
CHLORIDE SERPL-SCNC: 103 MMOL/L (ref 98–107)
CHOLEST SERPL-MCNC: 178 MG/DL (ref 0–199)
CHOLESTEROL/HDL RATIO: 2.4
CO2 SERPL-SCNC: 30 MMOL/L (ref 21–32)
CREAT SERPL-MCNC: 0.71 MG/DL (ref 0.5–1.05)
EST. AVERAGE GLUCOSE BLD GHB EST-MCNC: 114 MG/DL
GFR SERPL CREATININE-BSD FRML MDRD: >90 ML/MIN/1.73M*2
GLUCOSE SERPL-MCNC: 98 MG/DL (ref 74–99)
HBA1C MFR BLD: 5.6 %
HDLC SERPL-MCNC: 73.3 MG/DL
LDLC SERPL CALC-MCNC: 94 MG/DL
NON HDL CHOLESTEROL: 105 MG/DL (ref 0–149)
POTASSIUM SERPL-SCNC: 4.4 MMOL/L (ref 3.5–5.3)
PROT SERPL-MCNC: 6.5 G/DL (ref 6.4–8.2)
SODIUM SERPL-SCNC: 137 MMOL/L (ref 136–145)
TRIGL SERPL-MCNC: 53 MG/DL (ref 0–149)
VLDL: 11 MG/DL (ref 0–40)

## 2023-11-22 PROCEDURE — 80061 LIPID PANEL: CPT

## 2023-11-22 PROCEDURE — 80053 COMPREHEN METABOLIC PANEL: CPT

## 2023-11-22 PROCEDURE — 36415 COLL VENOUS BLD VENIPUNCTURE: CPT

## 2023-11-22 PROCEDURE — 83036 HEMOGLOBIN GLYCOSYLATED A1C: CPT

## 2023-11-26 RX ORDER — SERTRALINE HYDROCHLORIDE 50 MG/1
50 TABLET, FILM COATED ORAL DAILY
Qty: 90 TABLET | Refills: 0 | OUTPATIENT
Start: 2023-11-26

## 2023-11-27 ENCOUNTER — APPOINTMENT (OUTPATIENT)
Dept: PRIMARY CARE | Facility: CLINIC | Age: 68
End: 2023-11-27
Payer: MEDICARE

## 2023-11-28 ENCOUNTER — OFFICE VISIT (OUTPATIENT)
Dept: PRIMARY CARE | Facility: CLINIC | Age: 68
End: 2023-11-28
Payer: MEDICARE

## 2023-11-28 VITALS
TEMPERATURE: 97.3 F | SYSTOLIC BLOOD PRESSURE: 116 MMHG | BODY MASS INDEX: 23.03 KG/M2 | HEART RATE: 57 BPM | WEIGHT: 130 LBS | DIASTOLIC BLOOD PRESSURE: 72 MMHG | OXYGEN SATURATION: 99 %

## 2023-11-28 DIAGNOSIS — Z23 NEED FOR PNEUMOCOCCAL VACCINE: ICD-10-CM

## 2023-11-28 DIAGNOSIS — R73.03 PREDIABETES: ICD-10-CM

## 2023-11-28 DIAGNOSIS — K21.9 GASTROESOPHAGEAL REFLUX DISEASE WITHOUT ESOPHAGITIS: ICD-10-CM

## 2023-11-28 DIAGNOSIS — N95.2 ATROPHIC VAGINITIS: Primary | ICD-10-CM

## 2023-11-28 DIAGNOSIS — F43.22 ADJUSTMENT DISORDER WITH ANXIETY: ICD-10-CM

## 2023-11-28 PROCEDURE — 1160F RVW MEDS BY RX/DR IN RCRD: CPT | Performed by: FAMILY MEDICINE

## 2023-11-28 PROCEDURE — 1159F MED LIST DOCD IN RCRD: CPT | Performed by: FAMILY MEDICINE

## 2023-11-28 PROCEDURE — G0009 ADMIN PNEUMOCOCCAL VACCINE: HCPCS | Performed by: FAMILY MEDICINE

## 2023-11-28 PROCEDURE — 90677 PCV20 VACCINE IM: CPT | Performed by: FAMILY MEDICINE

## 2023-11-28 PROCEDURE — 1126F AMNT PAIN NOTED NONE PRSNT: CPT | Performed by: FAMILY MEDICINE

## 2023-11-28 PROCEDURE — 1036F TOBACCO NON-USER: CPT | Performed by: FAMILY MEDICINE

## 2023-11-28 PROCEDURE — 99214 OFFICE O/P EST MOD 30 MIN: CPT | Performed by: FAMILY MEDICINE

## 2023-11-28 RX ORDER — HYDROXYZINE HYDROCHLORIDE 10 MG/1
10 TABLET, FILM COATED ORAL 2 TIMES DAILY PRN
Qty: 60 TABLET | Refills: 0 | Status: SHIPPED | OUTPATIENT
Start: 2023-11-28 | End: 2024-03-07 | Stop reason: WASHOUT

## 2023-11-28 RX ORDER — ESTRADIOL 0.1 MG/G
CREAM VAGINAL
Qty: 42.5 G | Refills: 1 | Status: SHIPPED | OUTPATIENT
Start: 2023-11-28

## 2023-11-28 ASSESSMENT — ENCOUNTER SYMPTOMS
DIARRHEA: 0
HEADACHES: 0
DIZZINESS: 0
POLYPHAGIA: 0
DYSURIA: 0
DIFFICULTY URINATING: 0
ABDOMINAL PAIN: 0
NAUSEA: 0
SHORTNESS OF BREATH: 0
BLOOD IN STOOL: 0
SLEEP DISTURBANCE: 0
FATIGUE: 0
DYSPHORIC MOOD: 0
ARTHRALGIAS: 0
VOMITING: 0
CONSTIPATION: 0
POLYDIPSIA: 0
MYALGIAS: 0
ABDOMINAL DISTENTION: 0
PALPITATIONS: 0

## 2023-11-28 NOTE — PROGRESS NOTES
Subjective   Patient ID: Tere Rod is a 68 y.o. female who presents for GERD (Recheck ) and multiple issues.    GERD  She reports no abdominal pain, no chest pain or no nausea. Pertinent negatives include no fatigue.      Lipids: controlled. HDL 73, LDL 94, TG 53  Predm: improving. A1c 5.6(5.9). working on diet.   GERD: controlled. ENT filling med.   Vaginitis: stable on topical creams  Mood: controlled on prn hydroxy    Review of Systems   Constitutional:  Negative for fatigue.   HENT: Negative.     Eyes:  Negative for visual disturbance.   Respiratory:  Negative for shortness of breath.    Cardiovascular:  Negative for chest pain and palpitations.   Gastrointestinal:  Negative for abdominal distention, abdominal pain, blood in stool, constipation, diarrhea, nausea and vomiting.   Endocrine: Negative for cold intolerance, heat intolerance, polydipsia, polyphagia and polyuria.   Genitourinary:  Negative for difficulty urinating and dysuria.   Musculoskeletal:  Negative for arthralgias and myalgias.   Skin:  Negative for rash.   Neurological:  Negative for dizziness and headaches.   Psychiatric/Behavioral:  Negative for dysphoric mood and sleep disturbance.        Objective   /72   Pulse 57   Temp 36.3 °C (97.3 °F)   Wt 59 kg (130 lb)   SpO2 99%   BMI 23.03 kg/m²     Physical Exam  Vitals and nursing note reviewed.   Constitutional:       General: She is not in acute distress.     Appearance: Normal appearance. She is not toxic-appearing.   HENT:      Head: Normocephalic.   Eyes:      General: No scleral icterus.     Pupils: Pupils are equal, round, and reactive to light.   Neck:      Vascular: No carotid bruit.   Cardiovascular:      Rate and Rhythm: Normal rate and regular rhythm.      Pulses: Normal pulses.      Heart sounds: No murmur heard.  Pulmonary:      Effort: Pulmonary effort is normal. No respiratory distress.      Breath sounds: Normal breath sounds.   Abdominal:      General: Bowel sounds  are normal.      Palpations: Abdomen is soft.      Tenderness: There is no abdominal tenderness. There is no guarding.   Musculoskeletal:         General: No tenderness.      Right lower leg: No edema.      Left lower leg: No edema.   Skin:     General: Skin is warm.   Neurological:      General: No focal deficit present.      Mental Status: She is alert.      Cranial Nerves: No cranial nerve deficit.   Psychiatric:         Mood and Affect: Mood normal.         Assessment/Plan   Problem List Items Addressed This Visit             ICD-10-CM    Gastroesophageal reflux disease without esophagitis K21.9    Prediabetes R73.03    Relevant Orders    Comprehensive Metabolic Panel     Other Visit Diagnoses         Codes    Atrophic vaginitis    -  Primary N95.2    Relevant Medications    estradiol (Estrace) 0.01 % (0.1 mg/gram) vaginal cream    Adjustment disorder with anxiety     F43.22    Relevant Medications    hydrOXYzine HCL (Atarax) 10 mg tablet    Need for pneumococcal vaccine     Z23    Relevant Orders    Pneumococcal conjugate vaccine, 20-valent (PREVNAR 20)        Discussed blood work

## 2023-12-04 ENCOUNTER — TELEPHONE (OUTPATIENT)
Dept: PRIMARY CARE | Facility: CLINIC | Age: 68
End: 2023-12-04
Payer: MEDICARE

## 2023-12-04 NOTE — TELEPHONE ENCOUNTER
Patient had her 2 nd Pneumonia Shot she is having a reaction at the shot site Rash and it is about 3 inch oval. Please Advise what she should do?

## 2024-01-08 ENCOUNTER — ANESTHESIA EVENT (OUTPATIENT)
Dept: GASTROENTEROLOGY | Facility: HOSPITAL | Age: 69
End: 2024-01-08
Payer: MEDICARE

## 2024-01-08 ENCOUNTER — PREP FOR PROCEDURE (OUTPATIENT)
Dept: GASTROENTEROLOGY | Facility: CLINIC | Age: 69
End: 2024-01-08
Payer: MEDICARE

## 2024-01-08 RX ORDER — SODIUM CHLORIDE 9 MG/ML
20 INJECTION, SOLUTION INTRAVENOUS CONTINUOUS
Status: CANCELLED | OUTPATIENT
Start: 2024-01-08

## 2024-01-09 ENCOUNTER — ANESTHESIA (OUTPATIENT)
Dept: GASTROENTEROLOGY | Facility: HOSPITAL | Age: 69
End: 2024-01-09
Payer: MEDICARE

## 2024-01-09 ENCOUNTER — HOSPITAL ENCOUNTER (OUTPATIENT)
Dept: GASTROENTEROLOGY | Facility: HOSPITAL | Age: 69
Discharge: HOME | End: 2024-01-09
Payer: MEDICARE

## 2024-01-09 VITALS
SYSTOLIC BLOOD PRESSURE: 121 MMHG | BODY MASS INDEX: 23.21 KG/M2 | DIASTOLIC BLOOD PRESSURE: 71 MMHG | WEIGHT: 131 LBS | TEMPERATURE: 97.6 F | HEIGHT: 63 IN | OXYGEN SATURATION: 99 % | HEART RATE: 66 BPM | RESPIRATION RATE: 16 BRPM

## 2024-01-09 DIAGNOSIS — K21.9 GASTROESOPHAGEAL REFLUX DISEASE WITHOUT ESOPHAGITIS: ICD-10-CM

## 2024-01-09 PROCEDURE — 88305 TISSUE EXAM BY PATHOLOGIST: CPT | Performed by: STUDENT IN AN ORGANIZED HEALTH CARE EDUCATION/TRAINING PROGRAM

## 2024-01-09 PROCEDURE — 0753T DGTZ GLS MCRSCP SLD LEVEL IV: CPT | Mod: TC,PORLAB | Performed by: INTERNAL MEDICINE

## 2024-01-09 PROCEDURE — 2500000004 HC RX 250 GENERAL PHARMACY W/ HCPCS (ALT 636 FOR OP/ED): Performed by: INTERNAL MEDICINE

## 2024-01-09 PROCEDURE — 2500000005 HC RX 250 GENERAL PHARMACY W/O HCPCS: Performed by: NURSE ANESTHETIST, CERTIFIED REGISTERED

## 2024-01-09 PROCEDURE — 2500000004 HC RX 250 GENERAL PHARMACY W/ HCPCS (ALT 636 FOR OP/ED): Performed by: NURSE ANESTHETIST, CERTIFIED REGISTERED

## 2024-01-09 PROCEDURE — 7100000010 HC PHASE TWO TIME - EACH INCREMENTAL 1 MINUTE: Performed by: INTERNAL MEDICINE

## 2024-01-09 PROCEDURE — 7100000009 HC PHASE TWO TIME - INITIAL BASE CHARGE: Performed by: INTERNAL MEDICINE

## 2024-01-09 PROCEDURE — 3700000001 HC GENERAL ANESTHESIA TIME - INITIAL BASE CHARGE: Performed by: INTERNAL MEDICINE

## 2024-01-09 PROCEDURE — 43239 EGD BIOPSY SINGLE/MULTIPLE: CPT | Performed by: INTERNAL MEDICINE

## 2024-01-09 PROCEDURE — 3700000002 HC GENERAL ANESTHESIA TIME - EACH INCREMENTAL 1 MINUTE: Performed by: INTERNAL MEDICINE

## 2024-01-09 PROCEDURE — 88342 IMHCHEM/IMCYTCHM 1ST ANTB: CPT | Performed by: STUDENT IN AN ORGANIZED HEALTH CARE EDUCATION/TRAINING PROGRAM

## 2024-01-09 RX ORDER — ZINC GLUCONATE 100 MG
100 TABLET ORAL DAILY
COMMUNITY

## 2024-01-09 RX ORDER — LIDOCAINE HYDROCHLORIDE 20 MG/ML
INJECTION, SOLUTION INFILTRATION; PERINEURAL AS NEEDED
Status: DISCONTINUED | OUTPATIENT
Start: 2024-01-09 | End: 2024-01-09

## 2024-01-09 RX ORDER — METOCLOPRAMIDE HYDROCHLORIDE 5 MG/ML
INJECTION INTRAMUSCULAR; INTRAVENOUS AS NEEDED
Status: DISCONTINUED | OUTPATIENT
Start: 2024-01-09 | End: 2024-01-09

## 2024-01-09 RX ORDER — DEXAMETHASONE SODIUM PHOSPHATE 4 MG/ML
INJECTION, SOLUTION INTRA-ARTICULAR; INTRALESIONAL; INTRAMUSCULAR; INTRAVENOUS; SOFT TISSUE AS NEEDED
Status: DISCONTINUED | OUTPATIENT
Start: 2024-01-09 | End: 2024-01-09

## 2024-01-09 RX ORDER — DORZOLAMIDE HCL/PF 2 %
1 DROPS OPHTHALMIC (EYE) 2 TIMES DAILY
COMMUNITY
End: 2024-05-20 | Stop reason: ALTCHOICE

## 2024-01-09 RX ORDER — ONDANSETRON HYDROCHLORIDE 2 MG/ML
INJECTION, SOLUTION INTRAVENOUS AS NEEDED
Status: DISCONTINUED | OUTPATIENT
Start: 2024-01-09 | End: 2024-01-09

## 2024-01-09 RX ORDER — LACTOBACILLUS RHAMNOSUS GG 10B CELL
1 CAPSULE ORAL DAILY
COMMUNITY

## 2024-01-09 RX ORDER — FENTANYL CITRATE 50 UG/ML
INJECTION, SOLUTION INTRAMUSCULAR; INTRAVENOUS AS NEEDED
Status: DISCONTINUED | OUTPATIENT
Start: 2024-01-09 | End: 2024-01-09

## 2024-01-09 RX ORDER — LANOLIN ALCOHOL/MO/W.PET/CERES
1000 CREAM (GRAM) TOPICAL DAILY
COMMUNITY

## 2024-01-09 RX ORDER — SODIUM CHLORIDE 9 MG/ML
20 INJECTION, SOLUTION INTRAVENOUS CONTINUOUS
Status: DISCONTINUED | OUTPATIENT
Start: 2024-01-09 | End: 2024-01-10 | Stop reason: HOSPADM

## 2024-01-09 RX ORDER — PROPOFOL 10 MG/ML
INJECTION, EMULSION INTRAVENOUS AS NEEDED
Status: DISCONTINUED | OUTPATIENT
Start: 2024-01-09 | End: 2024-01-09

## 2024-01-09 RX ADMIN — PROPOFOL 30 MG: 10 INJECTION, EMULSION INTRAVENOUS at 08:22

## 2024-01-09 RX ADMIN — FENTANYL CITRATE 50 MCG: 50 INJECTION, SOLUTION INTRAMUSCULAR; INTRAVENOUS at 08:22

## 2024-01-09 RX ADMIN — PROPOFOL 10 MG: 10 INJECTION, EMULSION INTRAVENOUS at 08:24

## 2024-01-09 RX ADMIN — PROPOFOL 30 MG: 10 INJECTION, EMULSION INTRAVENOUS at 08:23

## 2024-01-09 RX ADMIN — SODIUM CHLORIDE 20 ML/HR: 9 INJECTION, SOLUTION INTRAVENOUS at 08:15

## 2024-01-09 RX ADMIN — FENTANYL CITRATE 25 MCG: 50 INJECTION, SOLUTION INTRAMUSCULAR; INTRAVENOUS at 08:23

## 2024-01-09 RX ADMIN — FENTANYL CITRATE 25 MCG: 50 INJECTION, SOLUTION INTRAMUSCULAR; INTRAVENOUS at 08:24

## 2024-01-09 RX ADMIN — DEXAMETHASONE SODIUM PHOSPHATE 4 MG: 4 INJECTION INTRA-ARTICULAR; INTRALESIONAL; INTRAMUSCULAR; INTRAVENOUS; SOFT TISSUE at 08:18

## 2024-01-09 RX ADMIN — PROPOFOL 10 MG: 10 INJECTION, EMULSION INTRAVENOUS at 08:26

## 2024-01-09 RX ADMIN — LIDOCAINE HYDROCHLORIDE 40 MG: 20 INJECTION, SOLUTION INFILTRATION; PERINEURAL at 08:23

## 2024-01-09 RX ADMIN — LIDOCAINE HYDROCHLORIDE 40 MG: 20 INJECTION, SOLUTION INFILTRATION; PERINEURAL at 08:22

## 2024-01-09 RX ADMIN — ONDANSETRON 4 MG: 2 INJECTION, SOLUTION INTRAMUSCULAR; INTRAVENOUS at 08:19

## 2024-01-09 RX ADMIN — METOCLOPRAMIDE 5 MG: 5 INJECTION, SOLUTION INTRAMUSCULAR; INTRAVENOUS at 08:20

## 2024-01-09 SDOH — HEALTH STABILITY: MENTAL HEALTH: CURRENT SMOKER: 0

## 2024-01-09 ASSESSMENT — COLUMBIA-SUICIDE SEVERITY RATING SCALE - C-SSRS
6. HAVE YOU EVER DONE ANYTHING, STARTED TO DO ANYTHING, OR PREPARED TO DO ANYTHING TO END YOUR LIFE?: NO
1. IN THE PAST MONTH, HAVE YOU WISHED YOU WERE DEAD OR WISHED YOU COULD GO TO SLEEP AND NOT WAKE UP?: NO
2. HAVE YOU ACTUALLY HAD ANY THOUGHTS OF KILLING YOURSELF?: NO

## 2024-01-09 ASSESSMENT — PAIN SCALES - GENERAL: PAINLEVEL_OUTOF10: 0 - NO PAIN

## 2024-01-09 ASSESSMENT — PAIN - FUNCTIONAL ASSESSMENT: PAIN_FUNCTIONAL_ASSESSMENT: 0-10

## 2024-01-09 NOTE — ANESTHESIA PREPROCEDURE EVALUATION
Patient: Tere Rod    Procedure Information       Date/Time: 01/09/24 0830    Scheduled providers: Attila Kramer MD    Procedure: EGD    Location:  [facility]            Relevant Problems   Anesthesia (within normal limits)      Endocrine   (+) Multiple thyroid nodules      GI   (+) Gastroesophageal reflux disease without esophagitis       Clinical information reviewed:   Tobacco  Allergies  Meds   Med Hx  Surg Hx   Fam Hx  Soc Hx        NPO Detail:  NPO/Void Status  Date of Last Liquid: 01/08/24  Time of Last Liquid: 2100  Date of Last Solid: 01/08/24  Time of Last Solid: 2100         Physical Exam    Airway  Mallampati: II     Cardiovascular - normal exam     Dental    Pulmonary - normal exam     Abdominal          Anesthesia Plan    History of general anesthesia?: yes  History of complications of general anesthesia?: no    ASA 2     MAC     The patient is not a current smoker.    Anesthetic plan and risks discussed with patient.  Use of blood products discussed with who consented to blood products.

## 2024-01-09 NOTE — PRE-SEDATION DOCUMENTATION
Patient: Tere Rod  MRN: [id number]    Pre-sedation Evaluation:  Sedation necessary for: Analgesia  Requesting service: GI    History of Present Illness:     Tere Rod is a 68 y.o. female with a history of prediabetes and GERD who presents for EGD to evaluate GERD and dysphagia.    She has never had an EGD before. XR showed no stricture, but did suggest the presence of an osteophyte at C5. There is a family history of esophageal cancer in her father.         Past Medical History:   Diagnosis Date    Cystocele, unspecified (CODE) 02/05/2019    Vaginal prolapse    Encounter for follow-up examination after completed treatment for conditions other than malignant neoplasm 09/29/2020    Postop check    Other conditions influencing health status 09/29/2020    History of rectocele    Personal history of cervical dysplasia 11/19/2020    History of cervical dysplasia    Personal history of gestational diabetes 10/05/2020    History of gestational diabetes mellitus (GDM)    Personal history of other (healed) physical injury and trauma 01/24/2019    History of kidney injury    Personal history of other diseases of urinary system     History of prolapse of bladder    Personal history of other specified conditions 01/22/2020    History of urinary urgency    Urinary tract infection, site not specified 08/25/2020    Acute UTI    Uterovaginal prolapse, unspecified 09/29/2020    Cystocele with uterine descensus       Principle problems:  Patient Active Problem List    Diagnosis Date Noted    Vitamin B12 deficiency 08/23/2023    Vitamin D deficiency 08/23/2023    Multiple thyroid nodules 08/17/2023    Chronic laryngitis 08/02/2023    Gastroesophageal reflux disease without esophagitis 03/17/2022    Prediabetes 01/01/2022    Allergic rhinitis 10/10/2018     Allergies:  Allergies   Allergen Reactions    Other Other     Opioids     PTA/Current Medications:  (Not in a hospital admission)    Current Outpatient Medications    Medication Sig Dispense Refill    cholecalciferol (Vitamin D-3) 5,000 Units tablet Take 1 tablet (5,000 Units) by mouth once daily.      cyanocobalamin (Vitamin B-12) 1,000 mcg tablet Take 1 tablet (1,000 mcg) by mouth once daily. sublingual      dorzolamide HCl/PF (dorzolamide, PF,) 2 % drops Administer 1 drop into affected eye(s) 2 times a day.      estradiol (Estrace) 0.01 % (0.1 mg/gram) vaginal cream Apply topically 2x per week 42.5 g 1    hydrOXYzine HCL (Atarax) 10 mg tablet Take 1 tablet (10 mg) by mouth 2 times a day as needed for anxiety. 60 tablet 0    lactobacillus (Culturelle) 10 billion cell capsule Take 1 capsule by mouth once daily.      latanoprost (Xalatan) 0.005 % ophthalmic solution INSTILL 1 DROP INTO BOTH EYES IN THE EVENING      loratadine (Claritin) 10 mg tablet Take by mouth.      omeprazole (PriLOSEC) 40 mg DR capsule Take 1 capsule (40 mg) by mouth early in the morning.. Do not crush or chew.      phytonadione, vit K1, (vitamin k) 100 mcg tablet Take 1 tablet (100 mcg) by mouth once daily.      Anusol-HC 2.5 % rectal cream Apply topically.       Current Facility-Administered Medications   Medication Dose Route Frequency Provider Last Rate Last Admin    sodium chloride 0.9% infusion  20 mL/hr intravenous Continuous Attila Kramer MD 20 mL/hr at 01/09/24 0815 20 mL/hr at 01/09/24 0815     Past Surgical History:   has a past surgical history that includes Other surgical history (12/27/2018); Other surgical history (12/27/2018); Other surgical history (11/22/2021); Other surgical history (09/02/2020); Other surgical history (09/02/2020); and Other surgical history (09/02/2020).    Recent sedation/surgery (24 hours) No    Review of Systems:  Please check all that apply: No significant medical history    Pregnancy test completed prior to procedure on any menstruating female: none        NPO guidelines met: Yes    Physical Exam    Airway  Mallampati: II  Neck ROM: full  Comments: Normal mouth  opening.   Cardiovascular   Rhythm: regular  Rate: normal  (-) murmur     Dental    Pulmonary   Breath sounds clear to auscultation  (-) wheezes       Other findings: Abdomen is soft, nontender, and not distended.    Patient is calm appearing and in no apparent distress.    Patient is awake and alert and oriented x4.      Plan    ASA 1     Moderate   (Sedation medications to be delivered via monitored anesthesia care (MAC).    This evaluation serves as my H&P.    Outpatient medication list and allergies have been reviewed.  Pre-procedure/alex procedure antibiotics not needed.    Pre-procedure evaluation completed by physician.    Surgeon has reviewed key risks related to the risk of angelica COVID-19 and if they contract COVID-19 what the risks are.)

## 2024-01-09 NOTE — ANESTHESIA POSTPROCEDURE EVALUATION
Patient: Tere Rod    Procedure Summary       Date: 01/09/24 Room / Location: [facility]    Anesthesia Start: 0811 Anesthesia Stop: 0835    Procedure: EGD Diagnosis: Gastroesophageal reflux disease without esophagitis    Scheduled Providers: Attila Kramer MD Responsible Provider: RAMOS Rao    Anesthesia Type: MAC ASA Status: 2            Anesthesia Type: MAC    Vitals Value Taken Time   /62 01/09/24 0843   Temp 37.1 °C (98.8 °F) 01/09/24 0833   Pulse 75 01/09/24 0843   Resp 16 01/09/24 0843   SpO2 100 % 01/09/24 0843       Anesthesia Post Evaluation    Patient location during evaluation: bedside  Patient participation: complete - patient participated  Level of consciousness: awake  Pain management: adequate  Airway patency: patent  Cardiovascular status: acceptable  Respiratory status: acceptable  Hydration status: acceptable  Postoperative Nausea and Vomiting: none    There were no known notable events for this encounter.

## 2024-01-10 NOTE — ADDENDUM NOTE
Encounter addended by: Carol Tellez RN on: 1/10/2024 2:25 PM   Actions taken: Contacts section saved, Flowsheet accepted

## 2024-01-17 LAB
LAB AP ASR DISCLAIMER: NORMAL
LABORATORY COMMENT REPORT: NORMAL
PATH REPORT.ADDENDUM SPEC: NORMAL
PATH REPORT.FINAL DX SPEC: NORMAL
PATH REPORT.GROSS SPEC: NORMAL
PATH REPORT.RELEVANT HX SPEC: NORMAL
PATH REPORT.TOTAL CANCER: NORMAL

## 2024-03-07 ENCOUNTER — LAB (OUTPATIENT)
Dept: LAB | Facility: LAB | Age: 69
End: 2024-03-07
Payer: MEDICARE

## 2024-03-07 ENCOUNTER — OFFICE VISIT (OUTPATIENT)
Dept: ENDOCRINOLOGY | Facility: CLINIC | Age: 69
End: 2024-03-07
Payer: MEDICARE

## 2024-03-07 VITALS
SYSTOLIC BLOOD PRESSURE: 122 MMHG | HEIGHT: 64 IN | BODY MASS INDEX: 22.53 KG/M2 | WEIGHT: 132 LBS | DIASTOLIC BLOOD PRESSURE: 66 MMHG | HEART RATE: 67 BPM

## 2024-03-07 DIAGNOSIS — E04.2 MULTIPLE THYROID NODULES: ICD-10-CM

## 2024-03-07 DIAGNOSIS — E06.3 HASHIMOTO'S THYROIDITIS: ICD-10-CM

## 2024-03-07 DIAGNOSIS — E04.2 MULTIPLE THYROID NODULES: Primary | ICD-10-CM

## 2024-03-07 LAB — TSH SERPL-ACNC: 2.04 MIU/L (ref 0.44–3.98)

## 2024-03-07 PROCEDURE — 36415 COLL VENOUS BLD VENIPUNCTURE: CPT

## 2024-03-07 PROCEDURE — 1160F RVW MEDS BY RX/DR IN RCRD: CPT | Performed by: INTERNAL MEDICINE

## 2024-03-07 PROCEDURE — 1036F TOBACCO NON-USER: CPT | Performed by: INTERNAL MEDICINE

## 2024-03-07 PROCEDURE — 99204 OFFICE O/P NEW MOD 45 MIN: CPT | Performed by: INTERNAL MEDICINE

## 2024-03-07 PROCEDURE — 1159F MED LIST DOCD IN RCRD: CPT | Performed by: INTERNAL MEDICINE

## 2024-03-07 PROCEDURE — 86376 MICROSOMAL ANTIBODY EACH: CPT

## 2024-03-07 PROCEDURE — 84443 ASSAY THYROID STIM HORMONE: CPT

## 2024-03-07 PROCEDURE — 1126F AMNT PAIN NOTED NONE PRSNT: CPT | Performed by: INTERNAL MEDICINE

## 2024-03-07 NOTE — PROGRESS NOTES
"Subjective   Tere Rod is a 68 y.o. female who I was asked to see in consultation for thyroid nodules.    She was having difficlty swallowing.  She was found to have GERD.  Her C4 spinous process was protruding into esophagus.  She was prescribed Omeprazole which has led to improvement.      Family history:  Father - hypothryoidism   Duaghter - goiter    She is a retired teacher, principal and special ed director     Symptoms are as listed below:  Energy levels -  good;  active with 18 month, 3 and 5 years old grandchildren   Sleep -  improved quality; watches screen time; goes to bed earlier; medication; 6-7 hours   Temperature intolerances -  denies  Bowel movements -  denies  Hair changes -  denies  Skin changes -  denies   Palpitations - with anxiety   Tremors -denies   Mood - depression a d anxiety  Increasing neck seize - denies   Dysphagia - improved   Dyspnea upon lying supine -  denies   Dysphonia -  denies; changed due to age     Max weight was 210 lbs when she was caring for her parents who both had cancer    Review of Systems   HENT:  Positive for hearing loss (Has left hearing aid).    Respiratory:          Snoring    Musculoskeletal:  Positive for neck pain.   All other systems reviewed and are negative.      Objective   Visit Vitals  /66 (BP Location: Right arm, Patient Position: Sitting, BP Cuff Size: Adult)   Pulse 67   Ht 1.613 m (5' 3.5\")   Wt 59.9 kg (132 lb)   BMI 23.02 kg/m²   Smoking Status Never   BSA 1.64 m²       Physical Exam  Vitals and nursing note reviewed.   Constitutional:       General: She is not in acute distress.     Appearance: Normal appearance. She is normal weight.   HENT:      Head: Normocephalic and atraumatic.      Nose: Nose normal.      Mouth/Throat:      Mouth: Mucous membranes are moist.   Eyes:      Extraocular Movements: Extraocular movements intact.   Neck:      Thyroid: No thyromegaly or thyroid tenderness.   Cardiovascular:      Rate and Rhythm: Normal " rate and regular rhythm.   Pulmonary:      Effort: Pulmonary effort is normal.      Breath sounds: Normal breath sounds.   Musculoskeletal:         General: Normal range of motion.   Skin:     General: Skin is warm.   Neurological:      Mental Status: She is alert and oriented to person, place, and time.   Psychiatric:         Mood and Affect: Mood normal.         Lab Review  Lab Results   Component Value Date    TSH 2.28 08/29/2023       === 08/17/23 ===    US THYROID    - Impression -  Slightly heterogenous background thyroid gland with multiple  subcentimeter nodules could be related to chronic thyroid disease. No  dominant nodule. Advise correlation with thyroid function test.    MACRO:  None      Assessment/Plan   68-year-old female presents for the evaluation for the management of thyroid nodules.  She is clinically euthyroid.  She has no compressive symptoms.  Thyroid nodules are less than 1 cm.    Multiple thyroid nodules  To obtain blood tests today   No intervention warranted for thyroid nodules measuring less than 1cm as per the American Thy[employer] Association   For follow up in 6 months with a repeat thyroid ultrasound

## 2024-03-07 NOTE — PATIENT INSTRUCTIONS
Thank you for choosing [facility]  for your health care needs.  If you have any questions, concerns or medical needs, please feel free to contact our office at [phone].    Please ensure you complete your blood work one week before the next scheduled appointment.    To obtain blood tests today   No intervention warranted for thyroid nodules measuring less than 1cm as per the American Thyroid Association   For follow up in 6 months with a repeat thyroid ultrasound

## 2024-03-08 LAB — THYROPEROXIDASE AB SERPL-ACNC: 418 IU/ML

## 2024-03-10 ASSESSMENT — ENCOUNTER SYMPTOMS: NECK PAIN: 1

## 2024-03-10 NOTE — RESULT ENCOUNTER NOTE
Labs have been reviewed.   The thyroid level is normal.  Testing was positive for Hashimoto's thyroiditis, an autoimmune condition.  This explains the appearance of the thyroid on the thyroid ultrasound.  This also predisposes to developing over hypothyroidism over time.  For follow up as scheduled with a repeat ultrasound on lab.

## 2024-04-29 ENCOUNTER — TELEPHONE (OUTPATIENT)
Dept: PRIMARY CARE | Facility: CLINIC | Age: 69
End: 2024-04-29
Payer: MEDICARE

## 2024-05-20 ENCOUNTER — OFFICE VISIT (OUTPATIENT)
Dept: PRIMARY CARE | Facility: CLINIC | Age: 69
End: 2024-05-20
Payer: MEDICARE

## 2024-05-20 VITALS
BODY MASS INDEX: 22.5 KG/M2 | TEMPERATURE: 97.4 F | WEIGHT: 127 LBS | HEART RATE: 67 BPM | HEIGHT: 63 IN | OXYGEN SATURATION: 98 % | SYSTOLIC BLOOD PRESSURE: 118 MMHG | DIASTOLIC BLOOD PRESSURE: 74 MMHG

## 2024-05-20 DIAGNOSIS — R73.03 PREDIABETES: ICD-10-CM

## 2024-05-20 DIAGNOSIS — Z00.00 ROUTINE GENERAL MEDICAL EXAMINATION AT HEALTH CARE FACILITY: ICD-10-CM

## 2024-05-20 DIAGNOSIS — E04.2 MULTIPLE THYROID NODULES: ICD-10-CM

## 2024-05-20 DIAGNOSIS — Z78.9 FULL CODE STATUS: ICD-10-CM

## 2024-05-20 DIAGNOSIS — K21.9 GASTROESOPHAGEAL REFLUX DISEASE WITHOUT ESOPHAGITIS: ICD-10-CM

## 2024-05-20 DIAGNOSIS — Z12.31 VISIT FOR SCREENING MAMMOGRAM: Primary | ICD-10-CM

## 2024-05-20 PROCEDURE — 99397 PER PM REEVAL EST PAT 65+ YR: CPT | Performed by: FAMILY MEDICINE

## 2024-05-20 PROCEDURE — 1170F FXNL STATUS ASSESSED: CPT | Performed by: FAMILY MEDICINE

## 2024-05-20 PROCEDURE — 1160F RVW MEDS BY RX/DR IN RCRD: CPT | Performed by: FAMILY MEDICINE

## 2024-05-20 PROCEDURE — G0446 INTENS BEHAVE THER CARDIO DX: HCPCS | Performed by: FAMILY MEDICINE

## 2024-05-20 PROCEDURE — 1159F MED LIST DOCD IN RCRD: CPT | Performed by: FAMILY MEDICINE

## 2024-05-20 PROCEDURE — 99214 OFFICE O/P EST MOD 30 MIN: CPT | Performed by: FAMILY MEDICINE

## 2024-05-20 PROCEDURE — G0439 PPPS, SUBSEQ VISIT: HCPCS | Performed by: FAMILY MEDICINE

## 2024-05-20 PROCEDURE — G0444 DEPRESSION SCREEN ANNUAL: HCPCS | Performed by: FAMILY MEDICINE

## 2024-05-20 PROCEDURE — 1123F ACP DISCUSS/DSCN MKR DOCD: CPT | Performed by: FAMILY MEDICINE

## 2024-05-20 PROCEDURE — 1158F ADVNC CARE PLAN TLK DOCD: CPT | Performed by: FAMILY MEDICINE

## 2024-05-20 RX ORDER — DORZOLAMIDE HYDROCHLORIDE AND TIMOLOL MALEATE PRESERVATIVE FREE 20; 5 MG/ML; MG/ML
1 SOLUTION/ DROPS OPHTHALMIC 2 TIMES DAILY
COMMUNITY
Start: 2024-05-13

## 2024-05-20 RX ORDER — AZELASTINE 1 MG/ML
SPRAY, METERED NASAL
COMMUNITY
Start: 2024-04-24

## 2024-05-20 ASSESSMENT — PATIENT HEALTH QUESTIONNAIRE - PHQ9
1. LITTLE INTEREST OR PLEASURE IN DOING THINGS: NOT AT ALL
2. FEELING DOWN, DEPRESSED OR HOPELESS: NOT AT ALL
SUM OF ALL RESPONSES TO PHQ9 QUESTIONS 1 AND 2: 0

## 2024-05-20 ASSESSMENT — ENCOUNTER SYMPTOMS
HEADACHES: 0
SHORTNESS OF BREATH: 0
VOMITING: 0
DIARRHEA: 0
DIFFICULTY URINATING: 0
MYALGIAS: 0
SLEEP DISTURBANCE: 0
BLOOD IN STOOL: 0
POLYPHAGIA: 0
CONSTIPATION: 0
DIZZINESS: 0
FATIGUE: 0
PALPITATIONS: 0
NAUSEA: 0
POLYDIPSIA: 0
DYSPHORIC MOOD: 0
DYSURIA: 0
ABDOMINAL PAIN: 0

## 2024-05-20 ASSESSMENT — ACTIVITIES OF DAILY LIVING (ADL)
DOING_HOUSEWORK: INDEPENDENT
DRESSING: INDEPENDENT
TAKING_MEDICATION: INDEPENDENT
GROCERY_SHOPPING: INDEPENDENT
BATHING: INDEPENDENT
MANAGING_FINANCES: INDEPENDENT

## 2024-05-20 NOTE — PATIENT INSTRUCTIONS
Recommend a predominant low fat whole foods plant based diet.  Cut back on meat, dairy, processed carbs, salt and oils(especially palm and coconut). Increase fiber in your diet.  Decrease alcohol as much as possible if you drink. Recommend regular exercise most days of the week(goal up to 150min per week). Also recommend good sleep habits aiming for 7-8 hours per night.     Follow up with your specialists as scheduled    Please bring in copies of your advance directives to your next appointment    Follow up with your specialists as scheduled    Return in 6 months, sooner if needed

## 2024-05-22 ENCOUNTER — HOSPITAL ENCOUNTER (OUTPATIENT)
Dept: RADIOLOGY | Facility: HOSPITAL | Age: 69
Discharge: HOME | End: 2024-05-22
Payer: MEDICARE

## 2024-05-22 DIAGNOSIS — Z12.31 VISIT FOR SCREENING MAMMOGRAM: ICD-10-CM

## 2024-05-22 PROCEDURE — 77067 SCR MAMMO BI INCL CAD: CPT

## 2024-05-22 PROCEDURE — 77067 SCR MAMMO BI INCL CAD: CPT | Performed by: RADIOLOGY

## 2024-05-22 PROCEDURE — 77063 BREAST TOMOSYNTHESIS BI: CPT | Performed by: RADIOLOGY

## 2024-06-13 RX ORDER — MINERAL OIL
ENEMA (ML) RECTAL
COMMUNITY
Start: 2024-04-24

## 2024-06-14 ENCOUNTER — APPOINTMENT (OUTPATIENT)
Dept: GASTROENTEROLOGY | Facility: CLINIC | Age: 69
End: 2024-06-14
Payer: MEDICARE

## 2024-06-14 VITALS
BODY MASS INDEX: 22.32 KG/M2 | HEART RATE: 66 BPM | WEIGHT: 126 LBS | SYSTOLIC BLOOD PRESSURE: 121 MMHG | OXYGEN SATURATION: 95 % | DIASTOLIC BLOOD PRESSURE: 73 MMHG | HEIGHT: 63 IN

## 2024-06-14 DIAGNOSIS — K21.9 GASTROESOPHAGEAL REFLUX DISEASE WITHOUT ESOPHAGITIS: ICD-10-CM

## 2024-06-14 PROBLEM — E04.9 NONTOXIC GOITER, UNSPECIFIED: Status: ACTIVE | Noted: 2023-08-17

## 2024-06-14 PROCEDURE — 1036F TOBACCO NON-USER: CPT | Performed by: INTERNAL MEDICINE

## 2024-06-14 PROCEDURE — 1159F MED LIST DOCD IN RCRD: CPT | Performed by: INTERNAL MEDICINE

## 2024-06-14 PROCEDURE — 1160F RVW MEDS BY RX/DR IN RCRD: CPT | Performed by: INTERNAL MEDICINE

## 2024-06-14 PROCEDURE — 99213 OFFICE O/P EST LOW 20 MIN: CPT | Performed by: INTERNAL MEDICINE

## 2024-06-14 ASSESSMENT — ENCOUNTER SYMPTOMS
NERVOUS/ANXIOUS: 0
ROS GI COMMENTS: AS DETAILED ABOVE.
FATIGUE: 0
CONFUSION: 0
HEADACHES: 0
ARTHRALGIAS: 0
MYALGIAS: 0
BRUISES/BLEEDS EASILY: 0
VOICE CHANGE: 0
SHORTNESS OF BREATH: 0
WEAKNESS: 0
WHEEZING: 0
COUGH: 0
SEIZURES: 0
CHILLS: 0
DIZZINESS: 0
PALPITATIONS: 0
HEMATURIA: 0
NUMBNESS: 0
FEVER: 0
ADENOPATHY: 0
TROUBLE SWALLOWING: 0
UNEXPECTED WEIGHT CHANGE: 0
DYSURIA: 0
SORE THROAT: 0

## 2024-06-14 NOTE — PROGRESS NOTES
[facility]    ASSESSMENT and PLAN:       Tere Rod is a 68 y.o. female with a significant past medical history of prediabetes and GERD who presents for follow up of GERD.       GERD  Longstanding symptoms of GERD currently well controlled . She is doing better on a PPI. Imaging (XR esophagram) shows no stenosis or stricture and EGD also shows no stricture. C5 osteophyte may be contributing to symptoms of dysphagia. There is a family history of esophageal cancer (and likely BE).  - continue PPI  - PRN H2RA for breakthrough      Follow up in 6-12 months.        Attila Kramer MD        Gastroenterology    [facility]    Clinical   [facility]        Subjective   HISTORY OF PRESENT ILLNESS:     Chief Complaint  Follow-up    History Of Present Illness:    Tere Rod is a 68 y.o. female with a significant past medical history of prediabetes and GERD who presents for follow up of GERD.    she follows with (Zenia Carney DO) as her PCP.      She was previously seen by ENT and then seen by her PCP who reported that ENT “scope showed chronic reflux”. Labs have shown a normal CBC, CMP, and TSH. XR esophagram on 8/17/2023 was normal except for a small hiatal hernia. MBBS did show a prominent cricopharyngeal impression at C5.    When I initially saw her she reported that she saw Dr. Christianson because of difficulty swallowing. He had started her on Omeprazole and with that she has noticed that her symptoms are 95-98% better. She also reported issues with heartburn for years with burning in her chest and regurgitation/sour taste in her mouth. I had recommended an EGD (detailed below).      She says that overall she is doing well. She is currently taking Omeprazole every other day. She does feel like that is controlling her symptoms. Prior to seeing Dr. Christianson she was having dysphagia with solids/pills. She  noticed difficulty starting a swallow and a sensation of food sitting in the back of her mouth. This has now resolved.        Patient denies any heartburn/GERD, N/V, dysphagia, odynophagia, abdominal pain, diarrhea, constipation, hematemesis, hematochezia, melena, or weight loss.      Endoscopy History  1/4/2023 - Colonoscopy: 2 mm cecal polyp (TA on path), melanosis, hemorrhoids  1/9/2024 - EGD: normal esophagus, normal stomach (chronic gastritis with no H pylori on path), normal duodenum      Review of systems:   Review of Systems   Constitutional:  Negative for chills, fatigue, fever and unexpected weight change.   HENT:  Negative for congestion, sneezing, sore throat, trouble swallowing and voice change.    Respiratory:  Negative for cough, shortness of breath and wheezing.    Cardiovascular:  Negative for chest pain, palpitations and leg swelling.   Gastrointestinal:         As detailed above.   Genitourinary:  Negative for dysuria and hematuria.   Musculoskeletal:  Negative for arthralgias and myalgias.   Skin:  Negative for pallor and rash.   Neurological:  Negative for dizziness, seizures, syncope, weakness, numbness and headaches.   Hematological:  Negative for adenopathy. Does not bruise/bleed easily.   Psychiatric/Behavioral:  Negative for confusion. The patient is not nervous/anxious.    All other systems reviewed and are negative.      I performed a complete 10 point review of systems and it is negative except as noted in HPI or above.      PAST HISTORIES:       Past Medical History:  She has a past medical history of Cystocele, unspecified (CODE) (02/05/2019), Encounter for follow-up examination after completed treatment for conditions other than malignant neoplasm (09/29/2020), Other conditions influencing health status (09/29/2020), Personal history of cervical dysplasia (11/19/2020), Personal history of gestational diabetes (10/05/2020), Personal history of other (healed) physical injury and trauma  "(01/24/2019), Personal history of other diseases of urinary system, Personal history of other specified conditions (01/22/2020), Urinary tract infection, site not specified (08/25/2020), and Uterovaginal prolapse, unspecified (09/29/2020).    Past Surgical History:  She has a past surgical history that includes Other surgical history (12/27/2018); Other surgical history (12/27/2018); Other surgical history (11/22/2021); Other surgical history (09/02/2020); Other surgical history (09/02/2020); and Other surgical history (09/02/2020).      Social History:  She reports that she has never smoked. She has never used smokeless tobacco. She reports that she does not currently use alcohol. She reports that she does not use drugs.    Family History:  No known GI disease, specifically denies any family history of pancreatitis, Crohn's, colon cancer, gastroesophageal cancer, or ulcerative colitis.    Family History   Problem Relation Name Age of Onset   • Diabetes Mother     • Hypertension Mother     • Colon cancer Mother     • Diabetes Father     • Hypertension Father     • Coronary artery disease Father     • Heart attack Father     • Esophageal cancer Father     • Diabetes Brother     • Hypertension Brother          Allergies:  Other      Objective   OBJECTIVE:       Last Recorded Vitals:  Vitals:    06/14/24 1021   BP: 121/73   Pulse: 66   SpO2: 95%   Weight: 57.2 kg (126 lb)   Height: 1.6 m (5' 3\")     /73   Pulse 66   Ht 1.6 m (5' 3\")   Wt 57.2 kg (126 lb)   SpO2 95%   BMI 22.32 kg/m²      Physical Exam:    Physical Exam  Vitals reviewed.   Constitutional:       General: She is not in acute distress.     Appearance: She is not ill-appearing.   HENT:      Head: Normocephalic and atraumatic.   Eyes:      General: No scleral icterus.  Cardiovascular:      Rate and Rhythm: Normal rate and regular rhythm.      Pulses: Normal pulses.      Heart sounds: Normal heart sounds. No murmur heard.  Pulmonary:      Effort: " Pulmonary effort is normal. No respiratory distress.      Breath sounds: Normal breath sounds. No wheezing.   Abdominal:      General: Bowel sounds are normal.      Palpations: Abdomen is soft.      Tenderness: There is no abdominal tenderness. There is no rebound.   Musculoskeletal:         General: No swelling or deformity.   Skin:     General: Skin is warm and dry.      Coloration: Skin is not jaundiced.      Findings: No rash.   Neurological:      General: No focal deficit present.      Mental Status: She is alert and oriented to person, place, and time.   Psychiatric:         Mood and Affect: Mood normal.         Behavior: Behavior normal.         Thought Content: Thought content normal.         Judgment: Judgment normal.         Home Medications:  Prior to Admission medications    Medication Sig Start Date End Date Taking? Authorizing Provider   fexofenadine (Allegra) 180 mg tablet Take 1 tablet every day by oral route for 90 days. 4/24/24  Yes Historical Provider, MD   azelastine (Astelin) 137 mcg (0.1 %) nasal spray SPRAY 2 SPRAYS TWICE A DAY BY INTRANASAL ROUTE 4/24/24   Historical Provider, MD   cholecalciferol (Vitamin D-3) 5,000 Units tablet Take 1 tablet (5,000 Units) by mouth once daily.    Historical Provider, MD   cyanocobalamin (Vitamin B-12) 1,000 mcg tablet Take 1 tablet (1,000 mcg) by mouth once daily. sublingual    Historical Provider, MD   dorzolamide-timolol, PF, (Cosopt) 2-0.5 % ophthalmic solution Administer 1 drop into both eyes 2 times a day. 5/13/24   Historical Provider, MD   estradiol (Estrace) 0.01 % (0.1 mg/gram) vaginal cream Apply topically 2x per week 11/28/23   Zenia Carney DO   lactobacillus (Culturelle) 10 billion cell capsule Take 1 capsule by mouth once daily.    Historical Provider, MD   latanoprost (Xalatan) 0.005 % ophthalmic solution INSTILL 1 DROP INTO BOTH EYES IN THE EVENING    Historical Provider, MD   loratadine (Claritin) 10 mg tablet Take by mouth.    Historical  "Provider, MD   omeprazole (PriLOSEC) 40 mg DR capsule Take 1 capsule (40 mg) by mouth early in the morning.. Do not crush or chew.    Historical Provider, MD   phytonadione, vit K1, (vitamin k) 100 mcg tablet Take 1 tablet (100 mcg) by mouth once daily.    Historical Provider, MD         Relevant Results Recent labs reviewed in the EMR.    Lab Results   Component Value Date/Time    WBC 4.6 08/15/2023 0841    HGB 13.5 08/15/2023 0841    HGB 14.1 08/05/2020 1351    MCV 92 08/15/2023 0841     08/15/2023 0841     08/05/2020 1351    [id number] 720 11/18/2021 0805       Lab Results   Component Value Date/Time     11/22/2023 0840    K 4.4 11/22/2023 0840     11/22/2023 0840    BUN 14 11/22/2023 0840    CREATININE 0.71 11/22/2023 0840    CREATININE 0.78 05/16/2023 0854    CREATININE 0.72 09/13/2022 0910       Lab Results   Component Value Date/Time    BILITOT 0.8 11/22/2023 0840    BILITOT 0.7 05/16/2023 0854    BILITOT 0.4 11/18/2021 0805    BILITOT 0.7 11/14/2020 0814    ALKPHOS 53 11/22/2023 0840    ALKPHOS 64 05/16/2023 0854    ALKPHOS 68 11/18/2021 0805    ALKPHOS 87 11/14/2020 0814    AST 16 11/22/2023 0840    AST 17 05/16/2023 0854    AST 17 11/18/2021 0805    AST 12 11/14/2020 0814    ALT 14 11/22/2023 0840    ALT 14 05/16/2023 0854    ALT 15 11/18/2021 0805    ALT 9 11/14/2020 0814       No results found for: \"CRP\", \"CALPS\"    Radiology: Imaging reviewed in the EMR.  BI mammo bilateral screening tomosynthesis    Result Date: 5/22/2024  Interpreted By:  Navin Mena, STUDY: BI MAMMO BILATERAL SCREENING TOMOSYNTHESIS;  5/22/2024 8:29 am   ACCESSION NUMBER(S): [id number]   ORDERING CLINICIAN: EILEEN ADAME   INDICATION: Screening.   COMPARISON: Multiple prior examinations dating back to 07/07/2011   FINDINGS: 2D and tomosynthesis images were reviewed at 1 mm slice thickness.   Density:  There are areas of scattered fibroglandular tissue.   No suspicious masses or calcifications are " "identified.       No mammographic evidence of malignancy.   BI-RADS CATEGORY:   BI-RADS Category:  1 Negative. Recommendation:  Annual Screening. Recommended Date:  1 Year. Laterality:  Bilateral.   For any future breast imaging appointments, please call [phone]).       Based on the Tyrer-Cuzick model for breast cancer risk assessment, the patient's lifetime risk of breast cancer is 2.55%. Patients with over a 20% lifetime risk of developing breast cancer may benefit from additional screening with breast MRI or ultrasound. Please note that this estimate is based on responses provided on the patient questionnaire. For more information regarding high risk consultation, please call [phone].   MACRO: None   Signed by: Navin Mena 5/22/2024 12:25 PM Dictation workstation:   [id number]      === 11/15/22 ===    CT HEART CALCIUM SCORING WO IV CONTRAST    - Impression -  1. Coronary artery calcium score of  0*.    *Coronary artery calcium scoring may be helpful in predicting the  risk for future coronary heart disease events.  According to the  American College of Cardiology Foundation Clinical Expert Consensus  Task Force, such testing provides important prognostic information in  patients with more than one coronary heart disease risk factor. The  coronary artery calcium score correlates with the annual risk of a  non-fatal myocardial infarction or coronary heart disease death.    Coronary artery score            Annual Risk    0-99                               0.4%  100-399                          1.3%  >400                              2.4%    These three \"breakpoints\" correspond to lower, intermediate and high  risk states for future coronary events.  Such information should be  used, along with appropriate clinical judgment, to make decisions  regarding the intensity of risk factor management strategies to treat  blood lipids and to modify other non-lipid coronary risk factors.    Reference: " Lio HAAS et al. Circulation.  2007; 115:402-426

## 2024-06-14 NOTE — PATIENT INSTRUCTIONS
Continue taking Omeprazole to block acid in your stomach.  You should take this medication every day.  Take it first thing in the morning about 30 minutes before breakfast.    If you have breakthrough symptoms you can use Famotidine (Pepcid) as needed which is available over the counter.        Follow up in 6-12 months.

## 2024-08-26 ENCOUNTER — TELEPHONE (OUTPATIENT)
Dept: ENDOCRINOLOGY | Facility: CLINIC | Age: 69
End: 2024-08-26

## 2024-08-26 ENCOUNTER — HOSPITAL ENCOUNTER (OUTPATIENT)
Dept: RADIOLOGY | Facility: HOSPITAL | Age: 69
Discharge: HOME | End: 2024-08-26
Payer: MEDICARE

## 2024-08-26 DIAGNOSIS — E04.2 MULTIPLE THYROID NODULES: ICD-10-CM

## 2024-08-26 PROCEDURE — 76536 US EXAM OF HEAD AND NECK: CPT | Performed by: RADIOLOGY

## 2024-08-26 PROCEDURE — 76536 US EXAM OF HEAD AND NECK: CPT

## 2024-08-26 NOTE — TELEPHONE ENCOUNTER
Patient has appt 9/19/2024, she asked if her ultrasound result can wait until next appt, patient is worried about ultrasound results

## 2024-09-19 ENCOUNTER — APPOINTMENT (OUTPATIENT)
Dept: ENDOCRINOLOGY | Facility: CLINIC | Age: 69
End: 2024-09-19
Payer: MEDICARE

## 2024-09-19 VITALS
WEIGHT: 126 LBS | DIASTOLIC BLOOD PRESSURE: 64 MMHG | HEART RATE: 66 BPM | SYSTOLIC BLOOD PRESSURE: 108 MMHG | BODY MASS INDEX: 22.32 KG/M2 | HEIGHT: 63 IN

## 2024-09-19 DIAGNOSIS — E06.3 HASHIMOTO'S THYROIDITIS: Primary | ICD-10-CM

## 2024-09-19 DIAGNOSIS — E04.2 MULTIPLE THYROID NODULES: ICD-10-CM

## 2024-09-19 PROCEDURE — 1159F MED LIST DOCD IN RCRD: CPT | Performed by: INTERNAL MEDICINE

## 2024-09-19 PROCEDURE — 1160F RVW MEDS BY RX/DR IN RCRD: CPT | Performed by: INTERNAL MEDICINE

## 2024-09-19 PROCEDURE — 3008F BODY MASS INDEX DOCD: CPT | Performed by: INTERNAL MEDICINE

## 2024-09-19 PROCEDURE — 99214 OFFICE O/P EST MOD 30 MIN: CPT | Performed by: INTERNAL MEDICINE

## 2024-09-19 PROCEDURE — G2211 COMPLEX E/M VISIT ADD ON: HCPCS | Performed by: INTERNAL MEDICINE

## 2024-09-19 ASSESSMENT — ENCOUNTER SYMPTOMS
NECK PAIN: 1
TROUBLE SWALLOWING: 1
COUGH: 1
PALPITATIONS: 1
NERVOUS/ANXIOUS: 1

## 2024-09-19 NOTE — PROGRESS NOTES
"Subjective   Tere Rod is a 68 y.o. female who presents for follow up for thyroid nodules.    Family history:  Father - hypothryoidism   Duaghter - goiter    She is a retired teacher, principal and special ed director     Her glands intermittently swelll  She is back on Claritin.  She is back into the gym.  She admits to having a high level of anxiety.  She cut back on coffee and stopped watching the news     Symptoms are as listed below:  Energy levels -  good  Sleep -  improved quality as she is tired and goes to bed  Temperature intolerances -  denies  Bowel movements -  no longer constipated; every other day; uses probiotic; 2 bottles of water per day   Hair changes -  denies  Skin changes -  denies   Palpitations - with anxiety   Tremors -denies   Mood - depression and anxiety  Increasing neck seize - denies   Dysphagia - improved   Dyspnea upon lying supine -  denies   Dysphonia -  denies    Max weight was 210 lbs when she was caring for her parents who both had cancer    Review of Systems   HENT:  Positive for hearing loss and trouble swallowing.    Respiratory:  Positive for cough.         Snoring    Cardiovascular:  Positive for palpitations.   Musculoskeletal:  Positive for neck pain.   Psychiatric/Behavioral:  The patient is nervous/anxious.    All other systems reviewed and are negative.      Objective   Visit Vitals  OB Status Postmenopausal   Smoking Status Never     Visit Vitals  /64 (BP Location: Left arm, Patient Position: Sitting, BP Cuff Size: Adult)   Pulse 66   Ht 1.6 m (5' 3\")   Wt 57.2 kg (126 lb)   BMI 22.32 kg/m²   OB Status Postmenopausal   Smoking Status Never   BSA 1.59 m²       Physical Exam  Vitals and nursing note reviewed.   Constitutional:       General: She is not in acute distress.     Appearance: Normal appearance. She is normal weight.   HENT:      Head: Normocephalic and atraumatic.      Nose: Nose normal.      Mouth/Throat:      Mouth: Mucous membranes are moist. "   Eyes:      Extraocular Movements: Extraocular movements intact.   Neck:      Thyroid: No thyromegaly or thyroid tenderness.   Cardiovascular:      Rate and Rhythm: Normal rate and regular rhythm.   Pulmonary:      Effort: Pulmonary effort is normal.      Breath sounds: Normal breath sounds.   Musculoskeletal:         General: Normal range of motion.   Skin:     General: Skin is warm.   Neurological:      Mental Status: She is alert and oriented to person, place, and time.   Psychiatric:         Mood and Affect: Mood normal.       Lab Review  Lab Results   Component Value Date    TSH 2.04 03/07/2024         Component  Ref Range & Units 6 mo ago   Thyroid Peroxidase (TPO) Antibody  <=60 IU/mL 418 High      === 08/17/23 ===    US THYROID    - Impression -  Slightly heterogenous background thyroid gland with multiple  subcentimeter nodules could be related to chronic thyroid disease. No  dominant nodule. Advise correlation with thyroid function test.    MACRO:  None    === 08/26/24 ===    US THYROID    - Impression -  Mild thyromegaly with heterogeneous echogenicity throughout. No  thyroid nodule in this exam.      TI-RADS grading system. ACR TI-RADS recommendations (apply to nodules  which have NOT been biopsied):    TR5 (?7 points) highly suspicious - FNA if ? 1cm, follow-up if 0.5  -0.9 cm every year for 5 years. Aggregate cancer risk 35%. TR4 (4-6  points) moderately suspicious - FNA if ? 1.5cm, follow-up if 1 -1.4  cm in 1, 2, 3 and 5 years. Aggregate cancer risk 9.1% TR3 (3 points)  mildly suspicious - FNA if ? 2.5cm, follow-up if 1.5 -2.4 cm in 1, 3  and 5 years. Aggregate cancer risk 4.8% TR2 (2 points) not  suspicious. No FNA or follow-up.Aggregate cancer risk 1.5% TR1 (0  points) benign - No FNA or follow-up. Aggregate cancer risk 0.3%    MACRO:  None    Signed by: Geoffrey Parks 8/26/2024 1:19 PM  Dictation workstation:   [id number]    Assessment/Plan   68-year-old female presents for follow up for thyroid  nodules.  These were described in 2023, but on her most recent thyroid ultrasound report, no discrete nodules were noted.  She is clinically euthyroid.  She has no compressive symptoms.      Hashimoto's thyroiditis  To obtain blood test at the next blood draw   No discrete nodules on thyroid ultrasound   There is a predisposition to developing overt hypothyroidism over time   For follow up in 6 months

## 2024-09-19 NOTE — PATIENT INSTRUCTIONS
Thank you for choosing [facility]  for your health care needs.  If you have any questions, concerns or medical needs, please feel free to contact our office at [phone].    Please ensure you complete your blood work one week before the next scheduled appointment.    To obtain blood test at the next blood draw   No discrete nodules on thyroid ultrasound   There is a predisposition to developing overt hypothyroidism over time   For follow up in 6 months

## 2024-09-22 PROBLEM — E06.3 HASHIMOTO'S THYROIDITIS: Status: ACTIVE | Noted: 2024-09-22

## 2024-09-22 NOTE — ASSESSMENT & PLAN NOTE
To obtain blood test at the next blood draw   No discrete nodules on thyroid ultrasound   There is a predisposition to developing overt hypothyroidism over time   For follow up in 6 months

## 2024-11-05 ENCOUNTER — LAB (OUTPATIENT)
Dept: LAB | Facility: LAB | Age: 69
End: 2024-11-05
Payer: MEDICARE

## 2024-11-05 DIAGNOSIS — R73.03 PREDIABETES: ICD-10-CM

## 2024-11-05 DIAGNOSIS — Z00.00 ROUTINE GENERAL MEDICAL EXAMINATION AT HEALTH CARE FACILITY: ICD-10-CM

## 2024-11-05 LAB
ALBUMIN SERPL BCP-MCNC: 3.9 G/DL (ref 3.4–5)
ALP SERPL-CCNC: 55 U/L (ref 33–136)
ALT SERPL W P-5'-P-CCNC: 15 U/L (ref 7–45)
ANION GAP SERPL CALC-SCNC: 12 MMOL/L (ref 10–20)
AST SERPL W P-5'-P-CCNC: 14 U/L (ref 9–39)
BILIRUB SERPL-MCNC: 0.5 MG/DL (ref 0–1.2)
BUN SERPL-MCNC: 15 MG/DL (ref 6–23)
CALCIUM SERPL-MCNC: 9 MG/DL (ref 8.6–10.3)
CHLORIDE SERPL-SCNC: 104 MMOL/L (ref 98–107)
CHOLEST SERPL-MCNC: 177 MG/DL (ref 0–199)
CHOLESTEROL/HDL RATIO: 2.7
CO2 SERPL-SCNC: 28 MMOL/L (ref 21–32)
CREAT SERPL-MCNC: 0.65 MG/DL (ref 0.5–1.05)
EGFRCR SERPLBLD CKD-EPI 2021: >90 ML/MIN/1.73M*2
EST. AVERAGE GLUCOSE BLD GHB EST-MCNC: 120 MG/DL
GLUCOSE SERPL-MCNC: 97 MG/DL (ref 74–99)
HBA1C MFR BLD: 5.8 %
HDLC SERPL-MCNC: 66.6 MG/DL
LDLC SERPL CALC-MCNC: 98 MG/DL
NON HDL CHOLESTEROL: 110 MG/DL (ref 0–149)
POTASSIUM SERPL-SCNC: 4.5 MMOL/L (ref 3.5–5.3)
PROT SERPL-MCNC: 6.5 G/DL (ref 6.4–8.2)
SODIUM SERPL-SCNC: 139 MMOL/L (ref 136–145)
TRIGL SERPL-MCNC: 60 MG/DL (ref 0–149)
VLDL: 12 MG/DL (ref 0–40)

## 2024-11-05 PROCEDURE — 80061 LIPID PANEL: CPT

## 2024-11-05 PROCEDURE — 36415 COLL VENOUS BLD VENIPUNCTURE: CPT

## 2024-11-05 PROCEDURE — 83036 HEMOGLOBIN GLYCOSYLATED A1C: CPT

## 2024-11-05 PROCEDURE — 80053 COMPREHEN METABOLIC PANEL: CPT

## 2024-11-20 ENCOUNTER — APPOINTMENT (OUTPATIENT)
Dept: PRIMARY CARE | Facility: CLINIC | Age: 69
End: 2024-11-20
Payer: MEDICARE

## 2024-11-20 VITALS
WEIGHT: 124.6 LBS | DIASTOLIC BLOOD PRESSURE: 78 MMHG | OXYGEN SATURATION: 97 % | TEMPERATURE: 97.5 F | HEART RATE: 60 BPM | SYSTOLIC BLOOD PRESSURE: 124 MMHG | BODY MASS INDEX: 22.07 KG/M2

## 2024-11-20 DIAGNOSIS — R73.03 PREDIABETES: Primary | ICD-10-CM

## 2024-11-20 DIAGNOSIS — E04.9 NONTOXIC GOITER, UNSPECIFIED: ICD-10-CM

## 2024-11-20 PROCEDURE — 1160F RVW MEDS BY RX/DR IN RCRD: CPT | Performed by: FAMILY MEDICINE

## 2024-11-20 PROCEDURE — 99213 OFFICE O/P EST LOW 20 MIN: CPT | Performed by: FAMILY MEDICINE

## 2024-11-20 PROCEDURE — G2211 COMPLEX E/M VISIT ADD ON: HCPCS | Performed by: FAMILY MEDICINE

## 2024-11-20 PROCEDURE — 1159F MED LIST DOCD IN RCRD: CPT | Performed by: FAMILY MEDICINE

## 2024-11-20 PROCEDURE — 1036F TOBACCO NON-USER: CPT | Performed by: FAMILY MEDICINE

## 2024-11-20 ASSESSMENT — ENCOUNTER SYMPTOMS
MYALGIAS: 0
SLEEP DISTURBANCE: 0
ABDOMINAL PAIN: 0
PALPITATIONS: 0
SHORTNESS OF BREATH: 0
NAUSEA: 0
DIARRHEA: 0
HEADACHES: 0
DIZZINESS: 0
FATIGUE: 0
VOMITING: 0
DYSURIA: 0
CONSTIPATION: 0

## 2024-11-20 NOTE — PROGRESS NOTES
Subjective   Patient ID: Tere Rod is a 69 y.o. female who presents for Hyperlipidemia (Recheck, review blood work) and Prediabetesand multiple issues.     Hyperlipidemia  Pertinent negatives include no chest pain, myalgias or shortness of breath.      Lipids: controlled. HDL 66, LDL 98, TG 60  Predm: trending up. A1c 5.8(5.6)  GERD: using PPI prn  Thyroid: seeing endo. Last TSH nml    Review of Systems   Constitutional:  Negative for fatigue.   Eyes:  Negative for visual disturbance.   Respiratory:  Negative for shortness of breath.    Cardiovascular:  Negative for chest pain and palpitations.   Gastrointestinal:  Negative for abdominal pain, constipation, diarrhea, nausea and vomiting.   Genitourinary:  Negative for dysuria.   Musculoskeletal:  Negative for myalgias.   Skin:  Negative for rash.   Neurological:  Negative for dizziness and headaches.   Psychiatric/Behavioral:  Negative for sleep disturbance.        Objective   /78   Pulse 60   Temp 36.4 °C (97.5 °F)   Wt 56.5 kg (124 lb 9.6 oz)   SpO2 97%   BMI 22.07 kg/m²     Physical Exam  Vitals and nursing note reviewed.   Constitutional:       General: She is not in acute distress.     Appearance: Normal appearance. She is not toxic-appearing.   HENT:      Head: Normocephalic.   Eyes:      General: No scleral icterus.     Pupils: Pupils are equal, round, and reactive to light.   Neck:      Vascular: No carotid bruit.   Cardiovascular:      Rate and Rhythm: Normal rate and regular rhythm.      Pulses: Normal pulses.      Heart sounds: No murmur heard.  Pulmonary:      Effort: Pulmonary effort is normal. No respiratory distress.      Breath sounds: Normal breath sounds.   Abdominal:      General: Bowel sounds are normal.      Palpations: Abdomen is soft.      Tenderness: There is no abdominal tenderness. There is no guarding.   Musculoskeletal:         General: No tenderness.      Cervical back: Neck supple.      Right lower leg: No edema.       Left lower leg: No edema.   Skin:     General: Skin is warm.   Neurological:      General: No focal deficit present.      Mental Status: She is alert.      Cranial Nerves: No cranial nerve deficit.   Psychiatric:         Mood and Affect: Mood normal.         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 Recommendations given

## 2024-11-20 NOTE — PATIENT INSTRUCTIONS
Recommend a predominant low fat whole foods plant based diet.  Cut back on meat, dairy, processed carbs, salt and oils(especially palm and coconut). Increase fiber in your diet.  Decrease alcohol as much as possible if you drink. Recommend regular exercise most days of the week(goal up to 150min per week). Also recommend good sleep habits aiming for 7-8 hours per night.     Continue your current meds    Recommend flu shot    Please bring in copies of your advance directives to your next appointment    Follow up with your specialists as scheduled    Return in 6 months,sooner if needed

## 2025-03-07 LAB — TSH SERPL-ACNC: 1.18 MIU/L (ref 0.4–4.5)

## 2025-03-19 NOTE — PATIENT INSTRUCTIONS
Thank you for choosing [facility]  for your health care needs.  If you have any questions, concerns or medical needs, please feel free to contact our office at [phone].    Please ensure you complete your blood work one week before the next scheduled appointment.    The thyroid is functioning normally   No discrete nodules on thyroid ultrasound from August 2024  There is a predisposition to developing overt hypothyroidism over time   For follow up in 6 months

## 2025-03-19 NOTE — PROGRESS NOTES
"Subjective   Tere Rod is a 69 y.o. female who presents for follow up for thyroid nodules and Hashimoto's.      Family history:  Father - hypothryoidism   Duaghter - goiter    She is a retired teacher, principal and special ed director     She checks her glucose and it would be 90-110mg/dL     Symptoms are as listed below:  Energy levels -  good  Sleep -  improved quality; going to bed earlier  Temperature intolerances -  her hands are cold more; the feet are cold also  Bowel movements -  no longer constipated; every other day; uses probiotic; 2 bottles of water per day   Hair changes -  improved   Skin changes -  denies   Palpitations - with anxiety   Tremors -denies   Mood - depression and anxiety; there is a medication that she can take; takes twice per month   Increasing neck seize - denies   Dysphagia - denies    Dyspnea upon lying supine -  denies   Dysphonia -  denies    She has been taking burburine, Vitamin K2, D3, B12    Max weight was 210 lbs when she was caring for her parents who both had cancer    Review of Systems   Constitutional:  Positive for fatigue.   HENT:  Positive for hearing loss.    Gastrointestinal:  Positive for constipation and diarrhea.   Genitourinary:         Nocturia x 1    Musculoskeletal:  Negative for arthralgias and back pain.   Psychiatric/Behavioral:  The patient is nervous/anxious.    All other systems reviewed and are negative.      Objective   Visit Vitals  OB Status Postmenopausal   Smoking Status Never     Visit Vitals  /74   Pulse 63   Ht 1.6 m (5' 3\")   Wt 56.2 kg (124 lb)   BMI 21.97 kg/m²   OB Status Postmenopausal   Smoking Status Never   BSA 1.58 m²         Physical Exam  Vitals and nursing note reviewed.   Constitutional:       General: She is not in acute distress.     Appearance: Normal appearance. She is normal weight.   HENT:      Head: Normocephalic and atraumatic.      Nose: Nose normal.      Mouth/Throat:      Mouth: Mucous membranes are moist. "   Eyes:      Extraocular Movements: Extraocular movements intact.   Neck:      Thyroid: No thyromegaly or thyroid tenderness.   Cardiovascular:      Rate and Rhythm: Normal rate and regular rhythm.   Pulmonary:      Effort: Pulmonary effort is normal.      Breath sounds: Normal breath sounds.   Musculoskeletal:         General: Normal range of motion.   Skin:     General: Skin is warm.   Neurological:      Mental Status: She is alert and oriented to person, place, and time.   Psychiatric:         Mood and Affect: Mood normal.       Lab Review  Lab Results   Component Value Date    TSH 1.18 03/06/2025         Component  Ref Range & Units 6 mo ago   Thyroid Peroxidase (TPO) Antibody  <=60 IU/mL 418 High      Imaging   === 08/17/23 ===    US THYROID    - Impression -  Slightly heterogenous background thyroid gland with multiple  subcentimeter nodules could be related to chronic thyroid disease. No  dominant nodule. Advise correlation with thyroid function test.    === 08/26/24 ===    US THYROID    - Impression -  Mild thyromegaly with heterogeneous echogenicity throughout. No  thyroid nodule in this exam.    Assessment/Plan   69-year-old female presents for follow up for thyroid nodules.  These were described in 2023, but on her most recent thyroid ultrasound report, no discrete nodules were noted. Testing was positive for Hashimoto's.      Hashimoto's thyroiditis  The thyroid is functioning normally   No discrete nodules on thyroid ultrasound from August 2024  There is a predisposition to developing overt hypothyroidism over time   For follow up in 6 months

## 2025-03-20 ENCOUNTER — APPOINTMENT (OUTPATIENT)
Dept: ENDOCRINOLOGY | Facility: CLINIC | Age: 70
End: 2025-03-20
Payer: MEDICARE

## 2025-03-20 VITALS
SYSTOLIC BLOOD PRESSURE: 126 MMHG | WEIGHT: 124 LBS | HEIGHT: 63 IN | BODY MASS INDEX: 21.97 KG/M2 | DIASTOLIC BLOOD PRESSURE: 74 MMHG | HEART RATE: 63 BPM

## 2025-03-20 DIAGNOSIS — E06.3 HASHIMOTO'S THYROIDITIS: ICD-10-CM

## 2025-03-20 PROCEDURE — 3008F BODY MASS INDEX DOCD: CPT | Performed by: INTERNAL MEDICINE

## 2025-03-20 PROCEDURE — 1159F MED LIST DOCD IN RCRD: CPT | Performed by: INTERNAL MEDICINE

## 2025-03-20 PROCEDURE — G2211 COMPLEX E/M VISIT ADD ON: HCPCS | Performed by: INTERNAL MEDICINE

## 2025-03-20 PROCEDURE — 99213 OFFICE O/P EST LOW 20 MIN: CPT | Performed by: INTERNAL MEDICINE

## 2025-03-20 PROCEDURE — 1160F RVW MEDS BY RX/DR IN RCRD: CPT | Performed by: INTERNAL MEDICINE

## 2025-03-20 ASSESSMENT — ENCOUNTER SYMPTOMS
DIARRHEA: 1
BACK PAIN: 0
CONSTIPATION: 1
ARTHRALGIAS: 0
NERVOUS/ANXIOUS: 1
FATIGUE: 1

## 2025-03-23 NOTE — ASSESSMENT & PLAN NOTE
The thyroid is functioning normally   No discrete nodules on thyroid ultrasound from August 2024  There is a predisposition to developing overt hypothyroidism over time   For follow up in 6 months

## 2025-04-24 ENCOUNTER — APPOINTMENT (OUTPATIENT)
Facility: CLINIC | Age: 70
End: 2025-04-24
Payer: MEDICARE

## 2025-04-24 VITALS
BODY MASS INDEX: 21.26 KG/M2 | HEIGHT: 63 IN | HEART RATE: 71 BPM | DIASTOLIC BLOOD PRESSURE: 82 MMHG | OXYGEN SATURATION: 95 % | WEIGHT: 120 LBS | SYSTOLIC BLOOD PRESSURE: 134 MMHG

## 2025-04-24 DIAGNOSIS — D12.6 ADENOMATOUS POLYP OF COLON, UNSPECIFIED PART OF COLON: ICD-10-CM

## 2025-04-24 DIAGNOSIS — K59.00 CONSTIPATION, UNSPECIFIED CONSTIPATION TYPE: Primary | ICD-10-CM

## 2025-04-24 DIAGNOSIS — K21.9 GASTROESOPHAGEAL REFLUX DISEASE WITHOUT ESOPHAGITIS: ICD-10-CM

## 2025-04-24 PROCEDURE — 1036F TOBACCO NON-USER: CPT | Performed by: INTERNAL MEDICINE

## 2025-04-24 PROCEDURE — 1160F RVW MEDS BY RX/DR IN RCRD: CPT | Performed by: INTERNAL MEDICINE

## 2025-04-24 PROCEDURE — 1159F MED LIST DOCD IN RCRD: CPT | Performed by: INTERNAL MEDICINE

## 2025-04-24 PROCEDURE — 99213 OFFICE O/P EST LOW 20 MIN: CPT | Performed by: INTERNAL MEDICINE

## 2025-04-24 PROCEDURE — 3008F BODY MASS INDEX DOCD: CPT | Performed by: INTERNAL MEDICINE

## 2025-04-24 ASSESSMENT — ENCOUNTER SYMPTOMS
WHEEZING: 0
WEAKNESS: 0
ROS GI COMMENTS: AS DETAILED ABOVE.
COUGH: 0
DYSURIA: 0
CONFUSION: 0
HEADACHES: 0
FATIGUE: 0
SORE THROAT: 0
PALPITATIONS: 0
DIZZINESS: 0
SEIZURES: 0
ARTHRALGIAS: 0
NUMBNESS: 0
TROUBLE SWALLOWING: 0
FEVER: 0
MYALGIAS: 0
VOICE CHANGE: 0
NERVOUS/ANXIOUS: 0
BRUISES/BLEEDS EASILY: 0
HEMATURIA: 0
ADENOPATHY: 0
SHORTNESS OF BREATH: 0
CHILLS: 0
UNEXPECTED WEIGHT CHANGE: 0

## 2025-04-24 NOTE — PROGRESS NOTES
[facility]    ASSESSMENT and PLAN:       Tere Rod is a 69 y.o. female with a significant past medical history of prediabetes and GERD who presents to discuss constipation.       Constipation  Change in bowel habits with increased constipation that coincided with diet changes as well as some decreased activity. There were no red-flags or alarm features and symptoms have now improved with MiraLAX. Would not recommend any additional work up at this time.  - continue MiraLAX, discussed self-titration of this medication with the patient      GERD  Longstanding symptoms of GERD currently well controlled . She is doing better on a PPI. Imaging (XR esophagram) shows no stenosis or stricture and EGD also shows no stricture. C5 osteophyte may be contributing to symptoms of dysphagia. There is a family history of esophageal cancer (and likely BE).  - continue PPI (titrate down to the lowest effective dose)  - PRN H2RA for breakthrough      Colon polyp  Diminutive polyp on last colonoscopy in 2023 that was adenomatous. Guidelines would suggest a repeat colonoscopy interval of 5 years given that result and her family history.  - repeat colonoscopy due in 2028      Follow up with GI as needed.          Attila Kramer MD        Senior Attending Physician, Gastroenterology    [facility]    Clinical   [facility] [facility] [employer]        Subjective   HISTORY OF PRESENT ILLNESS:     Chief Complaint  change in bowel habits    History Of Present Illness:    Tere Rod is a 69 y.o. female with a significant past medical history of prediabetes and GERD who presents to discuss constipation.    she follows with Zenia Carney DO as her PCP.       She was previously seen by ENT and then seen by her PCP who reported that ENT “scope showed chronic reflux”. Labs have shown a normal CBC, CMP, and TSH. XR esophagram on  8/17/2023 was normal except for a small hiatal hernia. MBBS did show a prominent cricopharyngeal impression at C5. When I initially saw her she reported that she saw Dr. Christianson because of difficulty swallowing. He had started her on Omeprazole and with that she has noticed that her symptoms are 95-98% better. She also reported issues with heartburn for years with burning in her chest and regurgitation/sour taste in her mouth. I had recommended an EGD (detailed below).        She says that recently she has been altering her diet in an attempt to be healthier. This has led to her following a more plant based diet and she has also been avoiding processed foods. With that change she has noticed some decreased frequency of BMs. She typically had a BM ever day or every other day, but recently she has been going every 2-3 days. Stool has been harder. This has been accompanied by increased gas/bloating and some mild left sided discomfort. She started MiraLAX daily and she has also been increasing her water intake. That has led to improvement so that she is now have a daily BM again.      She has also restarted her Omeprazole. She says that she had done well off of it for about 6 months, but had increased heartburn so she started taking it again. She is taking it daily right now, but previously had been able to decrease the dose to every other day.      Patient denies any N/V, dysphagia, odynophagia, abdominal pain, diarrhea, hematemesis, hematochezia, melena, or weight loss.      Endoscopy History  1/4/2023 - Colonoscopy: 2 mm cecal polyp (TA on path), melanosis, hemorrhoids  1/9/2024 - EGD: normal esophagus, normal stomach (chronic gastritis with no H pylori on path), normal duodenum    Review of systems:   Review of Systems   Constitutional:  Negative for chills, fatigue, fever and unexpected weight change.   HENT:  Negative for congestion, sneezing, sore throat, trouble swallowing and voice change.    Respiratory:   Negative for cough, shortness of breath and wheezing.    Cardiovascular:  Negative for chest pain, palpitations and leg swelling.   Gastrointestinal:         As detailed above.   Genitourinary:  Negative for dysuria and hematuria.   Musculoskeletal:  Negative for arthralgias and myalgias.   Skin:  Negative for pallor and rash.   Neurological:  Negative for dizziness, seizures, syncope, weakness, numbness and headaches.   Hematological:  Negative for adenopathy. Does not bruise/bleed easily.   Psychiatric/Behavioral:  Negative for confusion. The patient is not nervous/anxious.    All other systems reviewed and are negative.      I performed a complete 10 point review of systems and it is negative except as noted in HPI or above.      PAST HISTORIES:       Past Medical History:  Problem List[1]  She has a past medical history of Cystocele, unspecified (CODE) (02/05/2019), Encounter for follow-up examination after completed treatment for conditions other than malignant neoplasm (09/29/2020), Other conditions influencing health status (09/29/2020), Personal history of cervical dysplasia (11/19/2020), Personal history of gestational diabetes (10/05/2020), Personal history of other (healed) physical injury and trauma (01/24/2019), Personal history of other diseases of urinary system, Personal history of other specified conditions (01/22/2020), Urinary tract infection, site not specified (08/25/2020), and Uterovaginal prolapse, unspecified (09/29/2020).    Past Surgical History:  She has a past surgical history that includes Other surgical history (12/27/2018); Other surgical history (12/27/2018); Other surgical history (11/22/2021); Other surgical history (09/02/2020); Other surgical history (09/02/2020); and Other surgical history (09/02/2020).      Social History:  She reports that she has never smoked. She has never used smokeless tobacco. She reports that she does not currently use alcohol. She reports that she does  "not use drugs.    Family History:    Family History[2]     Allergies:  Other      Objective   OBJECTIVE:       Last Recorded Vitals:  Vitals:    04/24/25 1541   BP: 134/82   Pulse: 71   SpO2: 95%   Weight: 54.4 kg (120 lb)   Height: 1.6 m (5' 3\")     /82   Pulse 71   Ht 1.6 m (5' 3\")   Wt 54.4 kg (120 lb)   SpO2 95%   BMI 21.26 kg/m²      Physical Exam:    Physical Exam  Vitals reviewed.   Constitutional:       General: She is not in acute distress.     Appearance: She is not ill-appearing.   HENT:      Head: Normocephalic and atraumatic.   Eyes:      General: No scleral icterus.  Cardiovascular:      Rate and Rhythm: Normal rate and regular rhythm.      Pulses: Normal pulses.      Heart sounds: Normal heart sounds. No murmur heard.  Pulmonary:      Effort: Pulmonary effort is normal. No respiratory distress.      Breath sounds: Normal breath sounds. No wheezing.   Abdominal:      General: Bowel sounds are normal.      Palpations: Abdomen is soft.      Tenderness: There is no abdominal tenderness. There is no rebound.   Musculoskeletal:         General: No swelling or deformity.   Skin:     General: Skin is warm and dry.      Coloration: Skin is not jaundiced.      Findings: No rash.   Neurological:      General: No focal deficit present.      Mental Status: She is alert and oriented to person, place, and time.   Psychiatric:         Mood and Affect: Mood normal.         Behavior: Behavior normal.         Thought Content: Thought content normal.         Judgment: Judgment normal.         Home Medications:  Prior to Admission medications    Medication Sig Start Date End Date Taking? Authorizing Provider   cholecalciferol (Vitamin D-3) 5,000 Units tablet Take 1 tablet (125 mcg) by mouth once daily.   Yes Historical Provider, MD   cyanocobalamin (Vitamin B-12) 1,000 mcg tablet Take 1 tablet (1,000 mcg) by mouth once daily. sublingual   Yes Historical Provider, MD   dorzolamide-timolol, PF, (Cosopt) 2-0.5 % " ophthalmic solution Administer 1 drop into both eyes 2 times a day. 5/13/24  Yes Historical Provider, MD   estradiol (Estrace) 0.01 % (0.1 mg/gram) vaginal cream Apply topically 2x per week 11/28/23  Yes Zenia Carney,    lactobacillus (Culturelle) 10 billion cell capsule Take 1 capsule by mouth once daily.   Yes Historical Provider, MD   latanoprost (Xalatan) 0.005 % ophthalmic solution INSTILL 1 DROP INTO BOTH EYES IN THE EVENING   Yes Historical Provider, MD   loratadine (Claritin) 10 mg tablet Take by mouth.   Yes Historical Provider, MD   omeprazole (PriLOSEC) 40 mg DR capsule Take 1 capsule (40 mg) by mouth every other day. Do not crush or chew.   Yes Historical Provider, MD   phytonadione, vit K1, (vitamin k) 100 mcg tablet Take 1 tablet (100 mcg) by mouth once daily.   Yes Historical Provider, MD         Relevant Results Recent labs reviewed in the EMR.    Lab Results   Component Value Date/Time    WBC 4.6 08/15/2023 0841    HGB 13.5 08/15/2023 0841    HGB 14.1 08/05/2020 1351    MCV 92 08/15/2023 0841     08/15/2023 0841     08/05/2020 1351    [id number] 720 11/18/2021 0805       Lab Results   Component Value Date/Time     11/05/2024 0830    K 4.5 11/05/2024 0830     11/05/2024 0830    BUN 15 11/05/2024 0830    CREATININE 0.65 11/05/2024 0830    CREATININE 0.71 11/22/2023 0840       Lab Results   Component Value Date/Time    BILITOT 0.5 11/05/2024 0830    BILITOT 0.8 11/22/2023 0840    BILITOT 0.7 05/16/2023 0854    BILITOT 0.4 11/18/2021 0805    BILITOT 0.7 11/14/2020 0814    ALKPHOS 55 11/05/2024 0830    ALKPHOS 53 11/22/2023 0840    ALKPHOS 64 05/16/2023 0854    ALKPHOS 68 11/18/2021 0805    ALKPHOS 87 11/14/2020 0814    AST 14 11/05/2024 0830    AST 16 11/22/2023 0840    AST 17 05/16/2023 0854    AST 17 11/18/2021 0805    AST 12 11/14/2020 0814    ALT 15 11/05/2024 0830    ALT 14 11/22/2023 0840    ALT 14 05/16/2023 0854    ALT 15 11/18/2021 0805    ALT 9 11/14/2020 0814  "      No results found for: \"CRP\", \"CALPS\"    Radiology: Imaging reviewed in the EMR.             [1]   Patient Active Problem List  Diagnosis    Allergic rhinitis    Gastroesophageal reflux disease without esophagitis    Multiple thyroid nodules    Prediabetes    Vitamin B12 deficiency    Vitamin D deficiency    Chronic laryngitis    Nontoxic goiter, unspecified    Hashimoto's thyroiditis   [2]   Family History  Problem Relation Name Age of Onset    Diabetes Mother      Hypertension Mother      Colon cancer Mother      Diabetes Father      Hypertension Father      Coronary artery disease Father      Heart attack Father      Esophageal cancer Father      Diabetes Brother      Hypertension Brother       "

## 2025-04-24 NOTE — PATIENT INSTRUCTIONS
I recommend that you use MiraLAX for constipation. This is available over the counter. I would recommend that you start taking 1 capful once daily.  If that is not effective after 4-5 days you can increase the dose.  You can increase the dose up to 2 capfuls twice daily if needed.  If the initial dose is causing your stools to be too loose or too many bowel movements then you can decrease the dose as much as you need to, but try to find a dose that you can take every day or every other day that is giving you soft and easy to pass stools.      Follow up with GI as needed.

## 2025-05-14 LAB
ALBUMIN SERPL-MCNC: 4.1 G/DL (ref 3.6–5.1)
ALP SERPL-CCNC: 48 U/L (ref 37–153)
ALT SERPL-CCNC: 12 U/L (ref 6–29)
ANION GAP SERPL CALCULATED.4IONS-SCNC: 8 MMOL/L (CALC) (ref 7–17)
AST SERPL-CCNC: 14 U/L (ref 10–35)
BILIRUB SERPL-MCNC: 0.7 MG/DL (ref 0.2–1.2)
BUN SERPL-MCNC: 17 MG/DL (ref 7–25)
CALCIUM SERPL-MCNC: 9.4 MG/DL (ref 8.6–10.4)
CHLORIDE SERPL-SCNC: 103 MMOL/L (ref 98–110)
CO2 SERPL-SCNC: 27 MMOL/L (ref 20–32)
CREAT SERPL-MCNC: 0.73 MG/DL (ref 0.6–1)
EGFRCR SERPLBLD CKD-EPI 2021: 88 ML/MIN/1.73M2
EST. AVERAGE GLUCOSE BLD GHB EST-MCNC: 123 MG/DL
EST. AVERAGE GLUCOSE BLD GHB EST-SCNC: 6.8 MMOL/L
GLUCOSE SERPL-MCNC: 101 MG/DL (ref 65–99)
HBA1C MFR BLD: 5.9 %
POTASSIUM SERPL-SCNC: 4.3 MMOL/L (ref 3.5–5.3)
PROT SERPL-MCNC: 6.8 G/DL (ref 6.1–8.1)
SODIUM SERPL-SCNC: 138 MMOL/L (ref 135–146)

## 2025-05-15 ENCOUNTER — TELEPHONE (OUTPATIENT)
Dept: PRIMARY CARE | Facility: CLINIC | Age: 70
End: 2025-05-15
Payer: MEDICARE

## 2025-05-15 NOTE — TELEPHONE ENCOUNTER
Pt had labs done recently but noticed a lipid panel was not ordered. Pt asking if she is due for a lipid panel? Please advise

## 2025-05-20 ENCOUNTER — APPOINTMENT (OUTPATIENT)
Dept: PRIMARY CARE | Facility: CLINIC | Age: 70
End: 2025-05-20
Payer: MEDICARE

## 2025-05-20 VITALS
BODY MASS INDEX: 21.69 KG/M2 | TEMPERATURE: 97.3 F | OXYGEN SATURATION: 99 % | WEIGHT: 122.4 LBS | HEART RATE: 62 BPM | DIASTOLIC BLOOD PRESSURE: 64 MMHG | SYSTOLIC BLOOD PRESSURE: 114 MMHG | HEIGHT: 63 IN

## 2025-05-20 DIAGNOSIS — R73.03 PREDIABETES: ICD-10-CM

## 2025-05-20 DIAGNOSIS — E53.8 VITAMIN B12 DEFICIENCY: ICD-10-CM

## 2025-05-20 DIAGNOSIS — M25.512 CHRONIC LEFT SHOULDER PAIN: ICD-10-CM

## 2025-05-20 DIAGNOSIS — E55.9 VITAMIN D DEFICIENCY: ICD-10-CM

## 2025-05-20 DIAGNOSIS — G89.29 CHRONIC LEFT SHOULDER PAIN: ICD-10-CM

## 2025-05-20 DIAGNOSIS — M54.2 CERVICALGIA: ICD-10-CM

## 2025-05-20 DIAGNOSIS — Z00.00 ROUTINE GENERAL MEDICAL EXAMINATION AT HEALTH CARE FACILITY: Primary | ICD-10-CM

## 2025-05-20 DIAGNOSIS — Z12.31 ENCOUNTER FOR SCREENING MAMMOGRAM FOR MALIGNANT NEOPLASM OF BREAST: ICD-10-CM

## 2025-05-20 PROCEDURE — 99397 PER PM REEVAL EST PAT 65+ YR: CPT | Performed by: FAMILY MEDICINE

## 2025-05-20 PROCEDURE — 1123F ACP DISCUSS/DSCN MKR DOCD: CPT | Performed by: FAMILY MEDICINE

## 2025-05-20 PROCEDURE — 3008F BODY MASS INDEX DOCD: CPT | Performed by: FAMILY MEDICINE

## 2025-05-20 PROCEDURE — 1158F ADVNC CARE PLAN TLK DOCD: CPT | Performed by: FAMILY MEDICINE

## 2025-05-20 PROCEDURE — 1159F MED LIST DOCD IN RCRD: CPT | Performed by: FAMILY MEDICINE

## 2025-05-20 PROCEDURE — 99214 OFFICE O/P EST MOD 30 MIN: CPT | Performed by: FAMILY MEDICINE

## 2025-05-20 PROCEDURE — 1170F FXNL STATUS ASSESSED: CPT | Performed by: FAMILY MEDICINE

## 2025-05-20 PROCEDURE — 1160F RVW MEDS BY RX/DR IN RCRD: CPT | Performed by: FAMILY MEDICINE

## 2025-05-20 PROCEDURE — G0446 INTENS BEHAVE THER CARDIO DX: HCPCS | Performed by: FAMILY MEDICINE

## 2025-05-20 PROCEDURE — G0439 PPPS, SUBSEQ VISIT: HCPCS | Performed by: FAMILY MEDICINE

## 2025-05-20 PROCEDURE — 1036F TOBACCO NON-USER: CPT | Performed by: FAMILY MEDICINE

## 2025-05-20 RX ORDER — DORZOLAMIDE HCL 20 MG/ML
1 SOLUTION/ DROPS OPHTHALMIC 2 TIMES DAILY
COMMUNITY

## 2025-05-20 RX ORDER — BIMATOPROST 0.3 MG/ML
SOLUTION/ DROPS OPHTHALMIC
COMMUNITY
End: 2025-05-20 | Stop reason: ALTCHOICE

## 2025-05-20 RX ORDER — HYDROXYZINE HYDROCHLORIDE 10 MG/1
10 TABLET, FILM COATED ORAL 2 TIMES DAILY PRN
COMMUNITY

## 2025-05-20 ASSESSMENT — ENCOUNTER SYMPTOMS
FATIGUE: 0
MYALGIAS: 0
PALPITATIONS: 0
DYSURIA: 0
SHORTNESS OF BREATH: 0
SLEEP DISTURBANCE: 0
DIFFICULTY URINATING: 0
POLYPHAGIA: 0
DIARRHEA: 0
ABDOMINAL DISTENTION: 0
ABDOMINAL PAIN: 0
BLOOD IN STOOL: 0
DIZZINESS: 0
CONSTIPATION: 0
VOMITING: 0
NAUSEA: 0
HEADACHES: 0
DYSPHORIC MOOD: 0
POLYDIPSIA: 0

## 2025-05-20 ASSESSMENT — ACTIVITIES OF DAILY LIVING (ADL)
GROCERY_SHOPPING: INDEPENDENT
BATHING: INDEPENDENT
DRESSING: INDEPENDENT
TAKING_MEDICATION: INDEPENDENT
DOING_HOUSEWORK: INDEPENDENT
MANAGING_FINANCES: INDEPENDENT

## 2025-05-20 ASSESSMENT — PATIENT HEALTH QUESTIONNAIRE - PHQ9
SUM OF ALL RESPONSES TO PHQ9 QUESTIONS 1 AND 2: 0
1. LITTLE INTEREST OR PLEASURE IN DOING THINGS: NOT AT ALL
2. FEELING DOWN, DEPRESSED OR HOPELESS: NOT AT ALL

## 2025-05-20 NOTE — PATIENT INSTRUCTIONS
Recommend a predominant low fat whole foods plant based diet.  Cut back on meat, dairy, processed carbs, salt and oils(especially palm and coconut). Increase fiber in your diet.  Decrease alcohol as much as possible if you drink. Recommend regular exercise most days of the week(goal up to 150min per week). Also recommend good sleep habits aiming for 7-8 hours per night.     You were referred for mammogram, xrays and PT    Recommend shingrix vaccine    Return in 6 months for recheck, sooner if needed

## 2025-05-20 NOTE — PROGRESS NOTES
"Subjective   Reason for Visit: Tere Rod is an 69 y.o. female here for a Medicare Wellness visit, CPE and multiple issues.     Past Medical, Surgical, and Family History reviewed and updated in chart.    Reviewed all medications by prescribing practitioner or clinical pharmacist (such as prescriptions, OTCs, herbal therapies and supplements) and documented in the medical record.    HPI    Patient Care Team:  Zeina Carney DO as PCP - General  Zenia Carney DO as PCP - [employer] PCP  Stacie Hinds OD (Optometry)  Burt Borjas MD as Referring Physician ([facility])  Stacie Hinds OD (Optometry)   Dr. Arizmendi-endocrinology    Predm: stable. A1c 5.9(5.8)%  Neck/left shoulder pain: Onset for for 1 year.  No injury.  Worse with reaching.  Chronic tightness in the left side of neck.  No radiation of pain.  BMI: Normal  Vitamin D/vitamin B12 deficiency: Has been stable    Chronic pain: Left shoulder and neck    Opiate use: None  Review of Systems   Constitutional:  Negative for fatigue.   HENT: Negative.     Eyes:  Negative for visual disturbance.   Respiratory:  Negative for shortness of breath.    Cardiovascular:  Negative for chest pain and palpitations.   Gastrointestinal:  Negative for abdominal distention, abdominal pain, blood in stool, constipation, diarrhea, nausea and vomiting.   Endocrine: Negative for cold intolerance, heat intolerance, polydipsia, polyphagia and polyuria.   Genitourinary:  Negative for difficulty urinating and dysuria.   Musculoskeletal:  Negative for myalgias.   Skin:  Negative for rash.   Allergic/Immunologic: Positive for environmental allergies (fall and spring).   Neurological:  Negative for dizziness and headaches.   Psychiatric/Behavioral:  Negative for dysphoric mood and sleep disturbance.        Objective   Vitals:  /64   Pulse 62   Temp 36.3 °C (97.3 °F)   Ht 1.6 m (5' 3\")   Wt 55.5 kg (122 lb 6.4 oz)   SpO2 99%   BMI " 21.68 kg/m²       Physical Exam  Vitals and nursing note reviewed.   Constitutional:       General: She is not in acute distress.     Appearance: Normal appearance. She is not toxic-appearing.   HENT:      Head: Normocephalic.      Right Ear: Tympanic membrane normal.      Left Ear: Tympanic membrane normal.      Nose: Nose normal.      Mouth/Throat:      Pharynx: Oropharynx is clear.   Eyes:      General: No scleral icterus.     Pupils: Pupils are equal, round, and reactive to light.   Neck:      Vascular: No carotid bruit.   Cardiovascular:      Rate and Rhythm: Normal rate and regular rhythm.      Pulses: Normal pulses.      Heart sounds: No murmur heard.  Pulmonary:      Effort: Pulmonary effort is normal. No respiratory distress.      Breath sounds: Normal breath sounds.   Abdominal:      General: Bowel sounds are normal.      Palpations: Abdomen is soft.      Tenderness: There is no abdominal tenderness. There is no guarding.   Musculoskeletal:      Cervical back: Neck supple.      Right lower leg: No edema.      Left lower leg: No edema.      Comments: Decreased range of motion bilateral C-spine rotation, worse to the right.  C-spine nontender to palpation.  Left shoulder with decreased range of motion internal rotation/abduction.  Negative empty can but pain versus resistance.  Strength and sensation intact bilateral arms and hands   Lymphadenopathy:      Cervical: No cervical adenopathy.   Skin:     General: Skin is warm.   Neurological:      General: No focal deficit present.      Mental Status: She is alert.      Cranial Nerves: No cranial nerve deficit.   Psychiatric:         Mood and Affect: Mood normal.         Assessment & Plan  Routine general medical examination at health care facility         Prediabetes    Orders:    Comprehensive Metabolic Panel; Future    Vitamin B12 deficiency    Orders:    Vitamin B12; Future    Vitamin D deficiency    Orders:    Vitamin D 25-Hydroxy,Total (for eval of Vitamin  D levels); Future    Encounter for screening mammogram for malignant neoplasm of breast    Orders:    BI mammo bilateral screening tomosynthesis; Future           Cardiac Risk Assessment  15 minutes were spent discussing Cardiovascular risk and, if needed, lifestyle modifications were recommended, including nutritional choices, exercise, and elimination of habits contributing to risk.   Aspirin use/disuse was discussed following the guidelines below:  low dose ASA ( mg) should be considered:    If prior Heart Attack/Stroke/Peripheral vascular disease:  Generally recommend daily low dose aspirin unless extremely high bleeding risk (e.g., gastrointestinal).    If no prior Heart Attack/Stroke/Peripheral vascular disease:              Age over 70: Do not use Aspirin for prevention    Age less than 70 and 10-year cardiovascular disease risk is >20%: use low dose Aspirin for prevention.                ASCVD risk counseling:  discussed ASCVD risk and score 8%.  Aspirin not indicated at this time. Lifestyle modifications including nutritional choices, exercise and trying to eliminate habits contributing to risk were discussed. Patient agreeable to make efforts to continue to control their current cardiovascular risk factors.     Discussed blood work including CMP, TSH, A1c and wellness issues. Reviewed screenings and immunizations. Recommendations given    Refer for x-rays and PT

## 2025-05-28 ENCOUNTER — HOSPITAL ENCOUNTER (OUTPATIENT)
Dept: RADIOLOGY | Facility: HOSPITAL | Age: 70
Discharge: HOME | End: 2025-05-28
Payer: MEDICARE

## 2025-05-28 VITALS — BODY MASS INDEX: 21.26 KG/M2 | HEIGHT: 63 IN | WEIGHT: 120 LBS

## 2025-05-28 DIAGNOSIS — G89.29 CHRONIC LEFT SHOULDER PAIN: ICD-10-CM

## 2025-05-28 DIAGNOSIS — Z12.31 ENCOUNTER FOR SCREENING MAMMOGRAM FOR MALIGNANT NEOPLASM OF BREAST: ICD-10-CM

## 2025-05-28 DIAGNOSIS — M25.512 CHRONIC LEFT SHOULDER PAIN: ICD-10-CM

## 2025-05-28 DIAGNOSIS — M54.2 CERVICALGIA: ICD-10-CM

## 2025-05-28 PROCEDURE — 77063 BREAST TOMOSYNTHESIS BI: CPT

## 2025-05-28 PROCEDURE — 73030 X-RAY EXAM OF SHOULDER: CPT | Mod: LEFT SIDE | Performed by: RADIOLOGY

## 2025-05-28 PROCEDURE — 77067 SCR MAMMO BI INCL CAD: CPT | Performed by: RADIOLOGY

## 2025-05-28 PROCEDURE — 73030 X-RAY EXAM OF SHOULDER: CPT | Mod: LT

## 2025-05-28 PROCEDURE — 72040 X-RAY EXAM NECK SPINE 2-3 VW: CPT

## 2025-05-28 PROCEDURE — 72040 X-RAY EXAM NECK SPINE 2-3 VW: CPT | Performed by: RADIOLOGY

## 2025-05-28 PROCEDURE — 77063 BREAST TOMOSYNTHESIS BI: CPT | Performed by: RADIOLOGY

## 2025-05-28 NOTE — TELEPHONE ENCOUNTER
Dr. Carney came to me, she had a note that pt called a couple times regarding a message that was sent to Dr. Carney 12 days ago that she did not address. Looks like this is the last message in pt's chart.  IEL could not see this message  Pt was seen in office on 5/20/25, after this message was sent and IEL informed pt she does not need Lipid Panel done  What else is pt needing?  Was there another question?  Do not see any other messages in pt's chart  Please call pt and find out if anything else is needed. Thanks. JW

## 2025-06-04 ENCOUNTER — EVALUATION (OUTPATIENT)
Dept: PHYSICAL THERAPY | Facility: HOSPITAL | Age: 70
End: 2025-06-04
Payer: MEDICARE

## 2025-06-04 DIAGNOSIS — M54.2 CERVICALGIA: Primary | ICD-10-CM

## 2025-06-04 DIAGNOSIS — G89.29 CHRONIC LEFT SHOULDER PAIN: ICD-10-CM

## 2025-06-04 DIAGNOSIS — M25.512 CHRONIC LEFT SHOULDER PAIN: ICD-10-CM

## 2025-06-04 PROCEDURE — 97162 PT EVAL MOD COMPLEX 30 MIN: CPT | Mod: GP | Performed by: PHYSICAL THERAPIST

## 2025-06-04 PROCEDURE — 97110 THERAPEUTIC EXERCISES: CPT | Mod: GP | Performed by: PHYSICAL THERAPIST

## 2025-06-04 NOTE — PROGRESS NOTES
Physical Therapy    Physical Therapy Evaluation and Treatment      Patient Name: Tere Rod  MRN: [id number]  Today's Date: 25  Visit #1  Time Entry:   Time Calculation  Start Time: 145  Stop Time: 230  Time Calculation (min): 45 min  PT Evaluation Time Entry  PT Evaluation (Moderate) Time Entry: 30  PT Therapeutic Procedures Time Entry  Therapeutic Exercise Time Entry: 15                   Assessment:  Chronic pain: Left shoulder and neck .  Onset 3 years, worse in last 1 year. No prior surgeries to neck or shoulders.  Neck/left shoulder pain: Onset for for 1 year. No injury. Worse with reaching. Chronic tightness in the left side of neck. Intermittent  Left upper arm  radiation of pain.  Pain worse at night. Pt demo left shoulder weakness and decreased posture awareness as well shoulder hiking with movement which will benefit from exercise and retraining   PT Assessment  PT Assessment Results: Pain, Orthopedic restrictions, Decreased range of motion, Decreased strength  Rehab Prognosis: Good     Plan:  OP PT Plan  Treatment/Interventions: Education/ Instruction, Manual therapy, Neuromuscular re-education, Ultrasound, Therapeutic exercises  PT Plan: Skilled PT  PT Frequency: 2 times per week  Duration: 6 week  Onset Date: 24  Certification Period Start Date: 25  Certification Period End Date: 25  Rehab Potential: Good  Plan of Care Agreement: Patient    Current Problem:   1. Cervicalgia  Referral to Physical Therapy    Follow Up In Physical Therapy      2. Chronic left shoulder pain  Referral to Physical Therapy    Follow Up In Physical Therapy          Subjective    Name and  confirmed  Pt has had 1 year of Cervicalgia and left shoulder pain  Tere enjoys sewing and being active with her grandkids and hopes to reduce pain    General:  General  Reason for Referral: cervicalgia, left shoulder chronic pain  Referred By: Rommel  Past Medical History Relevant to Rehab: chronic left shoulder  pain , cervicalgia, no hx of surgery or trauma to shoulder or neck    Precautions  STEADI Fall Risk Score (The score of 4 or more indicates an increased risk of falling): 0       Pain:  Pain Assessment  Pain Assessment: 0-10  0-10 (Numeric) Pain Score: 4  Pain Type: Chronic pain  Pain Location: Neck  Pain Orientation: Left  Pain Radiating Towards: left shoulder pain, left upper arm  Pain Frequency: Constant/continuous  Pain Onset: Ongoing  Effect of Pain on Daily Activities: pain effects her sewing and ADLs              Objective        General Assessments:  Chronic pain: Left shoulder and neck .  Onset 3 years, worse in last 1 year. No prior surgeries to neck or shoulders.  Neck/left shoulder pain: Onset for for 1 year. No injury. Worse with reaching. Chronic tightness in the left side of neck. Intermittent  Left upper arm  radiation of pain.  Pain worse at night.    Functional Assessments:  Independent ambulator  Retired   Goes to [employer], yoga, elliptical  Enjoys gradchildren  Does fruit arrangements for parties  Sidesleeping  - advise in posture alignment  Sewing  - educate in body mechanics    Extremity/Trunk Assessments:     AROM Cervical;  Flex WNL   Side bend Rt 30  ( *)   left 20 ( *)   Rotation Rt 25 ( *)   left 40 ( * )     AROM shoulder  Flex Rt 145  left 125 ( *)  ( hikes at neck/ needs cues )   Abd hikes left shoulder at neck  IR RT full , left T 8 ( *)   ER Full symmetrical     MMT   Left shoulder 4/5  overall flex, scaption, abd, ER  Left shoulder 5/5 IR  Bicep 5/5 Hardik   Tricep 5/5 Hardik  Rt 5/5      Posture, forwards head,  hikes left shoulder with forwards flexion  or scaption or abduction motions    Alignment is within normal limits.  Moderate C5-6 and C6-7 spondylosis with disc height loss and  osteophytes. Vertebral body heights are preserved. Posterior elements  are intact. Prevertebral soft tissues are unremarkable.      IMPRESSION:  1. Moderate C5-6 and C6-7  spondylosis.  Shoulder left xray no significant changes    Outcome Measures:  Dash 22 = 25.00%     Treatments:  EXERCISES       Date 6-4-25      VISIT# #1 # # #    REPS REPS REPS REPS                 Seated neck stretches  Side bend  rotation   10 sec x 3   10 sec x 3      Cervical retraction Cues needed 5x      Seated shoulder elevate with neck alignment 10 x cues needed      Scapular stabilzation  Ball on wall circles       Tbands  Rows  Lat pull       Shoulder flex, scaption, abd       Sidelying ER w/ towel  ABD    Pronelying I, T        Posture education  For sewing and sleeping and ADLs initiated      Retrain left shoulder flex  to reduce shoulder hiking  10 x   Advise mirror at home       left upper trap / neck       Manual left shoulder and neck              HEP JPMLVDPE          Access Code: [email or web address]  URL: [email or web address]    Exercises  - Seated Cervical Sidebending AROM  - 1 x daily - 7 x weekly - 3 sets - 10 hold  - Seated Cervical Rotation AROM  - 1 x daily - 7 x weekly - 3 sets - 10 hold  - Seated Chin Tuck with Neck Elongation  - 1 x daily - 7 x weekly - 10 reps - 5 hold  - Standing Shoulder Flexion to 90 Degrees  - 1 x daily - 7 x weekly - 10 reps  EDUCATION: Posture, left shoulder movement to avoid hiking shoulder  Outpatient Education  Individual(s) Educated: Patient  Education Provided: POC, Home Exercise Program, Posture    Goals:  Active       PT Problem       PT Goal        Start:  06/05/25    Expected End:  06/26/25       Pt to demo understanding of posture alignment  and demo ability to move left arm without hiking at shoulder and shows ability to move left shoulder without causing pain to the neck    Patient to be independent with home exercises for cervical and scapular area to increase ROM and stabilization for increased function    Patient to demonstrate awareness of neutral alignment for posture and understand strategies to use for sleeping, standing , sitting  and functional activities to reduce symptoms and / or radiculopathy             PT Goal        Start:  06/05/25    Expected End:  07/17/25       Pt to have 50% less pain in neck and left shoulder    Patient to demonstrate increased strength by 1/3 MMT grade in areas of weakness for increased function and reduced pain    Patient to have Quickdash of 4 points or more improvement for increased functional mobility

## 2025-06-05 ASSESSMENT — PAIN SCALES - GENERAL: PAINLEVEL_OUTOF10: 4

## 2025-06-05 ASSESSMENT — PAIN - FUNCTIONAL ASSESSMENT: PAIN_FUNCTIONAL_ASSESSMENT: 0-10

## 2025-06-05 ASSESSMENT — ENCOUNTER SYMPTOMS
OCCASIONAL FEELINGS OF UNSTEADINESS: 0
LOSS OF SENSATION IN FEET: 0
DEPRESSION: 0

## 2025-06-06 ENCOUNTER — TREATMENT (OUTPATIENT)
Dept: PHYSICAL THERAPY | Facility: HOSPITAL | Age: 70
End: 2025-06-06
Payer: MEDICARE

## 2025-06-06 DIAGNOSIS — G89.29 CHRONIC LEFT SHOULDER PAIN: ICD-10-CM

## 2025-06-06 DIAGNOSIS — M54.2 CERVICALGIA: ICD-10-CM

## 2025-06-06 DIAGNOSIS — M25.512 CHRONIC LEFT SHOULDER PAIN: ICD-10-CM

## 2025-06-06 PROCEDURE — 97140 MANUAL THERAPY 1/> REGIONS: CPT | Mod: GP | Performed by: PHYSICAL THERAPIST

## 2025-06-06 PROCEDURE — 97110 THERAPEUTIC EXERCISES: CPT | Mod: GP | Performed by: PHYSICAL THERAPIST

## 2025-06-06 ASSESSMENT — PAIN - FUNCTIONAL ASSESSMENT: PAIN_FUNCTIONAL_ASSESSMENT: 0-10

## 2025-06-06 ASSESSMENT — PAIN SCALES - GENERAL: PAINLEVEL_OUTOF10: 4

## 2025-06-06 NOTE — PROGRESS NOTES
Physical Therapy    Physical Therapy Treatment      Patient Name: Tere Rod  MRN: [id number]  : 1955   Today's Date: 2025  Visit # 2  Time Calculation  Start Time: 0100  Stop Time: 014  Time Calculation (min): 45 min    PT Therapeutic Procedures Time Entry  Manual Therapy Time Entry: 8  Therapeutic Exercise Time Entry: 29  PT Modalities Time Entry  Ultrasound Time Entry: 8          Auth:     Current Problem  Problem List Items Addressed This Visit           ICD-10-CM    Chronic left shoulder pain M25.512, G89.29    Cervicalgia M54.2        Subjective    Name and  confirmed  Pt is working on posture and reducing her left shoulder hiking.  /10 neck pain  Pt has had 1 year of Cervicalgia and left shoulder pain  Tere enjoys sewing and being active with her grandkids and hopes to reduce pain    Precautions  Precautions  STEADI Fall Risk Score (The score of 4 or more indicates an increased risk of falling): 0    Pain:  Pain Assessment  Pain Assessment: 0-10  0-10 (Numeric) Pain Score: 4  Pain Type: Chronic pain  Pain Location: Neck  Pain Orientation: Left  Pain Frequency: Constant/continuous  Pain Onset: Ongoing  Effect of Pain on Daily Activities: pain effect sewing      Objective         Pt pleased she is working on her posture and her control of left shoulder   So she does not hike up at neck with UE movement.  Added US and heat  and supine Manual which pt felt reduced her headache    General Assessments:  Chronic pain: Left shoulder and neck .  Onset 3 years, worse in last 1 year. No prior surgeries to neck or shoulders.  Neck/left shoulder pain: Onset for for 1 year. No injury. Worse with reaching. Chronic tightness in the left side of neck. Intermittent  Left upper arm  radiation of pain.  Pain worse at night.     Independent ambulator  Retired   Goes to [employer], yoga, elliptical  Enjoys gradchildren  Does fruit arrangements for parties  Sidesleeping  - advise in posture  alignment  Sewing  - educate in body mechanics        Posture, forwards head,  hikes left shoulder with forwards flexion  or scaption or abduction motions     Alignment is within normal limits.  Moderate C5-6 and C6-7 spondylosis with disc height loss and  osteophytes. Vertebral body heights are preserved. Posterior elements  are intact. Prevertebral soft tissues are unremarkable.      IMPRESSION:  1. Moderate C5-6 and C6-7 spondylosis.  Shoulder left xray no significant changes         Treatments:  EXERCISES           Date 6-4-25 6-6-25       VISIT# #1 #2 # #     REPS REPS REPS REPS                pulleys   Flex, scaption    3 min  3 min       Seated neck stretches  Side bend  Rotation  levator    10 sec x 3   10 sec x 3    10 sec x 3   10 sec x 3  10 sec x 2       Cervical retraction Cues needed 5x  cues to sit up tall with a visual cues        Seated shoulder elevate with neck alignment 10 x cues needed  10 x        Scapular stabilzation  Ball on wall circles           Tbands  Rows  Lat pull           Shoulder flex, scaption, abd           Sidelying ER w/ towel  ABD     Pronelying I, T            Posture education  For sewing and sleeping and ADLs initiated  working on this at home        Retrain left shoulder flex  to reduce shoulder hiking  10 x   Advise mirror at home  HEP       US left upper trap / neck      Heat     8 min 1.3 W/Cm2      8 min        Manual left neck    8 min   supine        posture educate    yes       HEP JPMLVDPE               Access Code: [email or web address]  URL: [email or web address]     Exercises  - Seated Cervical Sidebending AROM  - 1 x daily - 7 x weekly - 3 sets - 10 hold  - Seated Cervical Rotation AROM  - 1 x daily - 7 x weekly - 3 sets - 10 hold  - Seated Chin Tuck with Neck Elongation  - 1 x daily - 7 x weekly - 10 reps - 5 hold  - Standing Shoulder Flexion to 90 Degrees  - 1 x daily - 7 x weekly - 10 reps    Outpatient Education  Individual(s) Educated:  Patient  Education Provided: POC, Home Exercise Program, Posture  Assessment: neck pain and posture deviation . Responded well to ex and tx today     Plan: continue to reduce pain through use of manual, US , heat , and posture training and education    Goals:  Active       PT Problem       PT Goal        Start:  06/05/25    Expected End:  06/26/25       Pt to demo understanding of posture alignment  and demo ability to move left arm without hiking at shoulder and shows ability to move left shoulder without causing pain to the neck    Patient to be independent with home exercises for cervical and scapular area to increase ROM and stabilization for increased function    Patient to demonstrate awareness of neutral alignment for posture and understand strategies to use for sleeping, standing , sitting and functional activities to reduce symptoms and / or radiculopathy             PT Goal        Start:  06/05/25    Expected End:  07/17/25       Pt to have 50% less pain in neck and left shoulder    Patient to demonstrate increased strength by 1/3 MMT grade in areas of weakness for increased function and reduced pain    Patient to have Quickdash of 4 points or more improvement for increased functional mobility

## 2025-06-10 ENCOUNTER — TREATMENT (OUTPATIENT)
Dept: PHYSICAL THERAPY | Facility: HOSPITAL | Age: 70
End: 2025-06-10
Payer: MEDICARE

## 2025-06-10 DIAGNOSIS — M25.512 CHRONIC LEFT SHOULDER PAIN: ICD-10-CM

## 2025-06-10 DIAGNOSIS — M54.2 CERVICALGIA: ICD-10-CM

## 2025-06-10 DIAGNOSIS — G89.29 CHRONIC LEFT SHOULDER PAIN: ICD-10-CM

## 2025-06-10 PROCEDURE — 97110 THERAPEUTIC EXERCISES: CPT | Mod: GP,CQ | Performed by: PHYSICAL THERAPY ASSISTANT

## 2025-06-10 PROCEDURE — 97140 MANUAL THERAPY 1/> REGIONS: CPT | Mod: GP,CQ | Performed by: PHYSICAL THERAPY ASSISTANT

## 2025-06-10 ASSESSMENT — PAIN SCALES - GENERAL: PAINLEVEL_OUTOF10: 7

## 2025-06-10 ASSESSMENT — PAIN - FUNCTIONAL ASSESSMENT: PAIN_FUNCTIONAL_ASSESSMENT: 0-10

## 2025-06-10 NOTE — PROGRESS NOTES
Physical Therapy    Physical Therapy Treatment      Patient Name: Tere Rod  MRN: [id number]  : 1955   Today's Date: 6/10/2025  Visit # 3  Time Calculation  Start Time: 1300  Stop Time: 1352  Time Calculation (min): 52 min    PT Therapeutic Procedures Time Entry  Manual Therapy Time Entry: 10  Therapeutic Exercise Time Entry: 26  PT Modalities Time Entry  Hot/Cold Pack Time Entry: 8  Ultrasound Time Entry: 8            Current Problem  Problem List Items Addressed This Visit           ICD-10-CM    Chronic left shoulder pain M25.512, G89.29    Cervicalgia M54.2        Subjective    Patient reports increased pain due to gardening. Patient reports driving is better looking over shoulder. Reports HEP is helping.       Precautions       Pain:  Pain Assessment  Pain Assessment: 0-10  0-10 (Numeric) Pain Score: 7  Pain Type: Chronic pain  Pain Location: Neck  Pain Orientation: Left  Pain Frequency: Constant/continuous      Objective         Pt pleased she is working on her posture and her control of left shoulder   So she does not hike up at neck with UE movement.      General Assessments:  Chronic pain: Left shoulder and neck .  Onset 3 years, worse in last 1 year. No prior surgeries to neck or shoulders.  Neck/left shoulder pain: Onset for for 1 year. No injury. Worse with reaching. Chronic tightness in the left side of neck. Intermittent  Left upper arm  radiation of pain.  Pain worse at night.     Independent ambulator  Retired   Goes to [employer], yoga, elliptical  Enjoys gradchildren  Does fruit arrangements for parties  Sidesleeping  - advise in posture alignment  Sewing  - educate in body mechanics        Posture, forwards head,  hikes left shoulder with forwards flexion  or scaption or abduction motions     Alignment is within normal limits.  Moderate C5-6 and C6-7 spondylosis with disc height loss and  osteophytes. Vertebral body heights are preserved. Posterior elements  are  intact. Prevertebral soft tissues are unremarkable.      IMPRESSION:  1. Moderate C5-6 and C6-7 spondylosis.  Shoulder left xray no significant changes         Treatments:  EXERCISES           Date 6-4-25  6-6-25  6/10/25     VISIT# #1 #2 #3 #     REPS REPS REPS REPS                pulleys   Flex, scaption    3 min  3 min  3 min   3 min     Seated neck stretches  Side bend  Rotation  levator    10 sec x 3   10 sec x 3    10 sec x 3   10 sec x 3  10 sec x 2 10 sec x 3   10 sec x 3  10 sec x 2     Cervical retraction Cues needed 5x  cues to sit up tall with a visual cues        Seated shoulder elevate with neck alignment 10 x cues needed  10 x        Scapular stabilzation  Ball on wall circles      Alphabet x 2     Tbands  Rows  Lat pull        Green 2 x 10  Green 2 x 10     Shoulder flex, scaption, abd           Sidelying ER w/ towel  ABD     Pronelying I, T            Posture education  For sewing and sleeping and ADLs initiated  working on this at home        Retrain left shoulder flex  to reduce shoulder hiking  10 x   Advise mirror at home  HEP       US left upper trap / neck      Heat     8 min 1.3 W/Cm2      8 min    8 min 1.3 W/Cm2      8 min      Manual left neck    8 min   supine  10 min supine      posture educate    yes       HEP JPMLVDPE               Access Code: [email or web address]  URL: [email or web address]     Exercises  - Seated Cervical Sidebending AROM  - 1 x daily - 7 x weekly - 3 sets - 10 hold  - Seated Cervical Rotation AROM  - 1 x daily - 7 x weekly - 3 sets - 10 hold  - Seated Chin Tuck with Neck Elongation  - 1 x daily - 7 x weekly - 10 reps - 5 hold  - Standing Shoulder Flexion to 90 Degrees  - 1 x daily - 7 x weekly - 10 reps       Assessment:   Patient reports reduced pain to 1/10 post manual therapy. Reports no increased pain with addition of Tband/scapular stab ex this date. Patietn requires minimal cues to avoid compensation and pace of movement.      Plan: continue to  reduce pain through use of manual, US , heat , and posture training and education    Goals:  Active       PT Problem       PT Goal        Start:  06/05/25    Expected End:  06/26/25       Pt to demo understanding of posture alignment  and demo ability to move left arm without hiking at shoulder and shows ability to move left shoulder without causing pain to the neck    Patient to be independent with home exercises for cervical and scapular area to increase ROM and stabilization for increased function    Patient to demonstrate awareness of neutral alignment for posture and understand strategies to use for sleeping, standing , sitting and functional activities to reduce symptoms and / or radiculopathy             PT Goal        Start:  06/05/25    Expected End:  07/17/25       Pt to have 50% less pain in neck and left shoulder    Patient to demonstrate increased strength by 1/3 MMT grade in areas of weakness for increased function and reduced pain    Patient to have Quickdash of 4 points or more improvement for increased functional mobility

## 2025-06-12 ENCOUNTER — TREATMENT (OUTPATIENT)
Dept: PHYSICAL THERAPY | Facility: HOSPITAL | Age: 70
End: 2025-06-12
Payer: MEDICARE

## 2025-06-12 DIAGNOSIS — M54.2 CERVICALGIA: ICD-10-CM

## 2025-06-12 DIAGNOSIS — M25.512 CHRONIC LEFT SHOULDER PAIN: ICD-10-CM

## 2025-06-12 DIAGNOSIS — G89.29 CHRONIC LEFT SHOULDER PAIN: ICD-10-CM

## 2025-06-12 PROCEDURE — 97140 MANUAL THERAPY 1/> REGIONS: CPT | Mod: GP,CQ | Performed by: PHYSICAL THERAPY ASSISTANT

## 2025-06-12 PROCEDURE — 97110 THERAPEUTIC EXERCISES: CPT | Mod: GP,CQ | Performed by: PHYSICAL THERAPY ASSISTANT

## 2025-06-12 ASSESSMENT — PAIN SCALES - GENERAL: PAINLEVEL_OUTOF10: 3

## 2025-06-12 ASSESSMENT — PAIN - FUNCTIONAL ASSESSMENT: PAIN_FUNCTIONAL_ASSESSMENT: 0-10

## 2025-06-12 NOTE — PROGRESS NOTES
Physical Therapy    Physical Therapy Treatment      Patient Name: Tere Rod  MRN: [id number]  : 1955   Today's Date: 2025  Visit # 4  Time Calculation  Start Time: 1430  Stop Time: 1515  Time Calculation (min): 45 min    PT Therapeutic Procedures Time Entry  Therapeutic Exercise Time Entry: 29  Manual Therapy Time Entry: 8  PT Modalities Time Entry  Ultrasound Time Entry: 8            Current Problem  Problem List Items Addressed This Visit           ICD-10-CM    Chronic left shoulder pain M25.512, G89.29    Cervicalgia M54.2        Subjective    Patient reports pain at 3/10 this date, was able to garden and work on deck today. Patient reports driving is better looking over shoulder. Reports HEP is helping.       Precautions  Precautions  STEADI Fall Risk Score (The score of 4 or more indicates an increased risk of falling): 0    Pain:  Pain Assessment  Pain Assessment: 0-10  0-10 (Numeric) Pain Score: 3  Pain Type: Chronic pain  Pain Location: Neck  Pain Orientation: Left  Pain Frequency: Constant/continuous      Objective         Pt pleased she is working on her posture and her control of left shoulder   So she does not hike up at neck with UE movement.      General Assessments:  Chronic pain: Left shoulder and neck .  Onset 3 years, worse in last 1 year. No prior surgeries to neck or shoulders.  Neck/left shoulder pain: Onset for for 1 year. No injury. Worse with reaching. Chronic tightness in the left side of neck. Intermittent  Left upper arm  radiation of pain.  Pain worse at night.     Independent ambulator  Retired   Goes to [employer], yoga, elliptical  Enjoys gradchildren  Does fruit arrangements for parties  Sidesleeping  - advise in posture alignment  Sewing  - educate in body mechanics        Posture, forwards head,  hikes left shoulder with forwards flexion  or scaption or abduction motions     Alignment is within normal limits.  Moderate C5-6 and C6-7 spondylosis  with disc height loss and  osteophytes. Vertebral body heights are preserved. Posterior elements  are intact. Prevertebral soft tissues are unremarkable.      IMPRESSION:  1. Moderate C5-6 and C6-7 spondylosis.  Shoulder left xray no significant changes         Treatments:  EXERCISES           Date 6-4-25  6-6-25  6/10/25  6/12/25   VISIT# #1 #2 #3 #4     REPS REPS REPS REPS                pulleys   Flex, scaption    3 min  3 min  3 min   3 min  3 min  3 min   Seated neck stretches  Side bend  Rotation  levator    10 sec x 3   10 sec x 3    10 sec x 3   10 sec x 3  10 sec x 2 10 sec x 3   10 sec x 3  10 sec x 2    10 sec x 3   10 sec x 3  10 sec x 3   Cervical retraction Cues needed 5x  cues to sit up tall with a visual cues        Seated shoulder elevate with neck alignment 10 x cues needed  10 x        Scapular stabilzation  Ball on wall circles      Alphabet x 2  Alphabet x 2   Tbands  Rows  Lat pull        Green 2 x 10  Green 2 x 10    Green 2 x 10  Green 2 x 10   Shoulder flex, scaption, abd           Sidelying ER w/ towel  ABD     Pronelying I, T            Posture education  For sewing and sleeping and ADLs initiated  working on this at home        Retrain left shoulder flex  to reduce shoulder hiking  10 x   Advise mirror at home  HEP       US left upper trap / neck      Heat     8 min 1.3 W/Cm2      8 min    8 min 1.3 W/Cm2      8 min    8 min 1.3 W/Cm2   Manual left neck    8 min   supine  10 min supine  8 min supine    posture educate    yes       HEP [email or web address]      Pec stretch mid/low  Ball on wall  Tband lat pulls and rows    6/12/25  [email or web address]     Access Code: [email or web address]  URL: [email or web address]     Exercises  - Seated Cervical Sidebending AROM  - 1 x daily - 7 x weekly - 3 sets - 10 hold  - Seated Cervical Rotation AROM  - 1 x daily  - 7 x weekly - 3 sets - 10 hold  - Seated Chin Tuck with Neck Elongation  - 1 x daily - 7 x weekly - 10 reps - 5 hold  - Standing Shoulder Flexion to 90 Degrees  - 1 x daily - 7 x weekly - 10 reps       Assessment:   Patient reports reduced pain to 0/10 at end of session. Patient requires minimal cues to avoid compensation and pace of movement. Patient [provided HEP as stated.     Plan: continue to reduce pain through use of manual, US , heat , and posture training and education    Goals:  Active       PT Problem       PT Goal        Start:  06/05/25    Expected End:  06/26/25       Pt to demo understanding of posture alignment  and demo ability to move left arm without hiking at shoulder and shows ability to move left shoulder without causing pain to the neck    Patient to be independent with home exercises for cervical and scapular area to increase ROM and stabilization for increased function    Patient to demonstrate awareness of neutral alignment for posture and understand strategies to use for sleeping, standing , sitting and functional activities to reduce symptoms and / or radiculopathy             PT Goal        Start:  06/05/25    Expected End:  07/17/25       Pt to have 50% less pain in neck and left shoulder    Patient to demonstrate increased strength by 1/3 MMT grade in areas of weakness for increased function and reduced pain    Patient to have Quickdash of 4 points or more improvement for increased functional mobility

## 2025-06-17 ENCOUNTER — TREATMENT (OUTPATIENT)
Dept: PHYSICAL THERAPY | Facility: HOSPITAL | Age: 70
End: 2025-06-17
Payer: MEDICARE

## 2025-06-17 DIAGNOSIS — M25.512 CHRONIC LEFT SHOULDER PAIN: ICD-10-CM

## 2025-06-17 DIAGNOSIS — G89.29 CHRONIC LEFT SHOULDER PAIN: ICD-10-CM

## 2025-06-17 DIAGNOSIS — M54.2 CERVICALGIA: ICD-10-CM

## 2025-06-17 PROCEDURE — 97110 THERAPEUTIC EXERCISES: CPT | Mod: GP,CQ | Performed by: PHYSICAL THERAPY ASSISTANT

## 2025-06-17 PROCEDURE — 97140 MANUAL THERAPY 1/> REGIONS: CPT | Mod: GP,CQ | Performed by: PHYSICAL THERAPY ASSISTANT

## 2025-06-17 ASSESSMENT — PAIN SCALES - GENERAL: PAINLEVEL_OUTOF10: 5 - MODERATE PAIN

## 2025-06-17 ASSESSMENT — PAIN - FUNCTIONAL ASSESSMENT: PAIN_FUNCTIONAL_ASSESSMENT: 0-10

## 2025-06-17 NOTE — PROGRESS NOTES
Physical Therapy    Physical Therapy Treatment      Patient Name: Tere Rod  MRN: [id number]  : 1955   Today's Date: 2025  Visit # 5  Time Calculation  Start Time: 010  Stop Time: 0140  Time Calculation (min): 40 min    PT Therapeutic Procedures Time Entry  Therapeutic Exercise Time Entry: 24  Manual Therapy Time Entry: 8  PT Modalities Time Entry  Ultrasound Time Entry: 8            Current Problem  Problem List Items Addressed This Visit           ICD-10-CM    Chronic left shoulder pain M25.512, G89.29    Cervicalgia M54.2        Subjective    Patient reports she felt achy today maybe from sleeping on left side. Reports compliance with HEP.     Precautions       Pain:  Pain Assessment  Pain Assessment: 0-10  0-10 (Numeric) Pain Score: 5 - Moderate pain  Pain Type: Chronic pain  Pain Location: Neck  Pain Orientation: Left      Objective        General Assessments:  Chronic pain: Left shoulder and neck .  Onset 3 years, worse in last 1 year. No prior surgeries to neck or shoulders.  Neck/left shoulder pain: Onset for for 1 year. No injury. Worse with reaching. Chronic tightness in the left side of neck. Intermittent  Left upper arm  radiation of pain.  Pain worse at night.     Independent ambulator  Retired   Goes to [employer], yoga, elliptical  Enjoys gradchildren  Does fruit arrangements for parties  Sidesleeping  - advise in posture alignment  Sewing  - educate in body mechanics        Posture, forwards head,  hikes left shoulder with forwards flexion  or scaption or abduction motions     Alignment is within normal limits.  Moderate C5-6 and C6-7 spondylosis with disc height loss and  osteophytes. Vertebral body heights are preserved. Posterior elements  are intact. Prevertebral soft tissues are unremarkable.      IMPRESSION:  1. Moderate C5-6 and C6-7 spondylosis.  Shoulder left xray no significant changes         Treatments:  EXERCISES                  Date 25   6/10/25  6/12/25 6/17/25   VISIT# #1 #2 #3 #4 #5     REPS REPS REPS REPS                  pulleys   Flex, scaption    3 min  3 min  3 min   3 min 3 min  3 min 3 min  3 min   Seated neck stretches  Side bend  Rotation  levator    10 sec x 3   10 sec x 3    10 sec x 3   10 sec x 3  10 sec x 2 10 sec x 3   10 sec x 3  10 sec x 2    10 sec x 3   10 sec x 3  10 sec x 3   10 sec x 3   10 sec x 3  10 sec x 3   Cervical retraction Cues needed 5x  cues to sit up tall with a visual cues         Seated shoulder elevate with neck alignment 10 x cues needed  10 x         Scapular stabilzation  Ball on wall circles      Alphabet x 2  Alphabet x 2 Alphabet x 2   Tbands  Rows  Lat pull        Green 2 x 10  Green 2 x 10    Green 2 x 10  Green 2 x 10   Blue 2 x 10  Blue 2 x 10   Shoulder flex, scaption, abd to 90         1 x 10 each   Sidelying ER w/ towel  ABD     Pronelying I, T          2 x 10 each   Posture education  For sewing and sleeping and ADLs initiated  working on this at home         Retrain left shoulder flex  to reduce shoulder hiking  10 x   Advise mirror at home  HEP        US left upper trap / neck      Heat     8 min 1.3 W/Cm2      8 min    8 min 1.3 W/Cm2      8 min    8 min 1.3 W/Cm2 8 min 1.3 W/Cm2   Manual left neck    8 min   supine  10 min supine  8 min supine 8 min supine    posture educate    yes        HEP [email or web address]      Pec stretch mid/low  Ball on wall  Tband lat pulls and rows     6/12/25  [id number]     Access Code: [email or web address]  URL: [email or web address]     Exercises  - Seated Cervical Sidebending AROM  - 1 x daily - 7 x weekly - 3 sets - 10 hold  - Seated Cervical Rotation AROM  - 1 x daily - 7 x weekly - 3 sets - 10 hold  - Seated Chin Tuck with Neck Elongation  - 1 x daily - 7 x weekly - 10 reps - 5 hold  - Standing Shoulder Flexion  to 90 Degrees  - 1 x daily - 7 x weekly - 10 reps       Assessment:   Patient reports some discomfort with advanced strengthening exercises. Patient reports reduced pain to 36/10 at end of session. Patient requires minimal cues to avoid compensation and pace of movement. Patient feels manual therapy is most effective for pain relief.      Plan: continue to reduce pain through use of manual, US , heat , and posture training and education    Goals:  Active       PT Problem       PT Goal        Start:  06/05/25    Expected End:  06/26/25       Pt to demo understanding of posture alignment  and demo ability to move left arm without hiking at shoulder and shows ability to move left shoulder without causing pain to the neck    Patient to be independent with home exercises for cervical and scapular area to increase ROM and stabilization for increased function    Patient to demonstrate awareness of neutral alignment for posture and understand strategies to use for sleeping, standing , sitting and functional activities to reduce symptoms and / or radiculopathy             PT Goal        Start:  06/05/25    Expected End:  07/17/25       Pt to have 50% less pain in neck and left shoulder    Patient to demonstrate increased strength by 1/3 MMT grade in areas of weakness for increased function and reduced pain    Patient to have Quickdash of 4 points or more improvement for increased functional mobility

## 2025-06-19 ENCOUNTER — TREATMENT (OUTPATIENT)
Dept: PHYSICAL THERAPY | Facility: HOSPITAL | Age: 70
End: 2025-06-19
Payer: MEDICARE

## 2025-06-19 DIAGNOSIS — M54.2 CERVICALGIA: ICD-10-CM

## 2025-06-19 DIAGNOSIS — G89.29 CHRONIC LEFT SHOULDER PAIN: ICD-10-CM

## 2025-06-19 DIAGNOSIS — M25.512 CHRONIC LEFT SHOULDER PAIN: ICD-10-CM

## 2025-06-19 PROCEDURE — 97110 THERAPEUTIC EXERCISES: CPT | Mod: GP | Performed by: PHYSICAL THERAPIST

## 2025-06-19 PROCEDURE — 97140 MANUAL THERAPY 1/> REGIONS: CPT | Mod: GP | Performed by: PHYSICAL THERAPIST

## 2025-06-19 ASSESSMENT — PAIN SCALES - GENERAL
PAINLEVEL_OUTOF10: 3
PAINLEVEL_OUTOF10: 6

## 2025-06-19 ASSESSMENT — PAIN - FUNCTIONAL ASSESSMENT: PAIN_FUNCTIONAL_ASSESSMENT: 0-10

## 2025-06-19 NOTE — PROGRESS NOTES
Physical Therapy    Physical Therapy Treatment      Patient Name: Tere Rod  MRN: [id number]  : 1955   Today's Date: 2025  Visit # 6  Time Calculation  Start Time: 145  Stop Time: 230  Time Calculation (min): 45 min    PT Therapeutic Procedures Time Entry  Therapeutic Exercise Time Entry: 30  Manual Therapy Time Entry: 8  PT Modalities Time Entry  Ultrasound Time Entry: 7          Auth:     Current Problem  Problem List Items Addressed This Visit           ICD-10-CM    Chronic left shoulder pain M25.512, G89.29    Cervicalgia M54.2        Subjective    Name and  confirmed  3/10 neck,,  6/10 left shoulder  More aware of posture and feels it is helping  Pt would like to continue PT     Pt has had 1 year of Cervicalgia and left shoulder pain  Tere enjoys sewing and being active with her grandkids and hopes to reduce pain  Patient reports she felt achy today maybe from sleeping on left side. Reports compliance with HEP     Precautions  Precautions  STEADI Fall Risk Score (The score of 4 or more indicates an increased risk of falling): 0    Pain:  Pain Assessment  Pain Assessment: 0-10  0-10 (Numeric) Pain Score: 3  Pain Type: Chronic pain  Pain Location: Neck  Pain Orientation: Left  Multiple Pain Sites: Two  Pain 2  Pain Score 2: 6  Pain Type 2: Chronic pain  Pain Location 2: Shoulder  Pain Orientation 2: Left      Objective         Provided blue tband for home    General Assessments:  Chronic pain: Left shoulder and neck .  Onset 3 years, worse in last 1 year. No prior surgeries to neck or shoulders.  Neck/left shoulder pain: Onset for for 1 year. No injury. Worse with reaching. Chronic tightness in the left side of neck. Intermittent  Left upper arm  radiation of pain.  Pain worse at night.     Functional Assessments:  Independent ambulator  Retired   Goes to [employer], yoga, elliptical  Enjoys gradchildren  Does fruit arrangements for parties  Sidesleeping  - advise in  posture alignment  Sewing  - educate in body mechanics       Posture, forwards head,  hikes left shoulder with forwards flexion  or scaption or abduction motions     Alignment is within normal limits.  Moderate C5-6 and C6-7 spondylosis with disc height loss and  osteophytes. Vertebral body heights are preserved. Posterior elements  are intact. Prevertebral soft tissues are unremarkable.      IMPRESSION:  1. Moderate C5-6 and C6-7 spondylosis.  Shoulder left xray no significant changes     p  Treatments:  EXERCISES                     Date  6-6-25  6/10/25  6/12/25 6/17/25 6/19/25   VISIT# #2 #3 #4 #5 #6     REPS REPS REPS                    pulleys   Flex, scaption  3 min  3 min  3 min   3 min 3 min  3 min 3 min  3 min 3 min  3 min   Seated neck stretches  Side bend  Rotation  levator    10 sec x 3   10 sec x 3  10 sec x 2 10 sec x 3   10 sec x 3  10 sec x 2    10 sec x 3   10 sec x 3  10 sec x 3    10 sec x 3   10 sec x 3  10 sec x 3   10 sec x 3   10 sec x 3  10 sec x 3   Cervical retraction  cues to sit up tall with a visual cues        cues to sit up tall with a visual cues    Seated shoulder elevate with neck alignment  10 x           Scapular stabilzation  Ball on wall circles    Alphabet x 2  Alphabet x 2 Alphabet x 2 Alphabet x 2   Tbands  Rows  Lat pull  ER 2 hands      Green 2 x 10  Green 2 x 10    Green 2 x 10  Green 2 x 10    Blue 2 x 10  Blue 2 x 10   Blue 2 x 10  Blue 2 x 10  Blue 2 x 10   Shoulder flex, scaption, abd to 90       1 x 10 each    Sidelying ER w/ towel  ABD     Pronelying I, T        2 x 10 each    Posture education  For sewing and sleeping and ADLs  working on this at home           Retrain left shoulder flex  to reduce shoulder hiking   HEP          US left upper trap / neck        Heat   8 min 1.3 W/Cm2        8 min    8 min 1.3 W/Cm2        8 min    8 min 1.3 W/Cm2 8 min 1.3 W/Cm2 7 min 1.3 W/Cm2   Manual left neck  8 min   supine  10 min supine  8 min supine 8 min supine 8 min      posture educate  yes          HEP      Pec stretch mid/low  Ball on wall  Tband lat pulls and rows   Provide tband blue    6/12/25  [email or web address]=&[email or web address]=&[id number]     Access Code: [email or web address]  URL: [email or web address]     Exercises  - Seated Cervical Sidebending AROM  - 1 x daily - 7 x weekly - 3 sets - 10 hold  - Seated Cervical Rotation AROM  - 1 x daily - 7 x weekly - 3 sets - 10 hold  - Seated Chin Tuck with Neck Elongation  - 1 x daily - 7 x weekly - 10 reps - 5 hold  - Standing Shoulder Flexion to 90 Degrees  - 1 x daily - 7 x weekly - 10 reps       Outpatient Education  Individual(s) Educated: Patient  Education Provided: POC, Home Exercise Program, Posture    Assessment:  Patient requires minimal cues to avoid compensation and pace of movement. Patient feels manual therapy is most effective for pain relief.      Plan: continue to reduce pain through use of manual, US , heat , and posture training and education     Goals:  Active       PT Problem       PT Goal        Start:  06/05/25    Expected End:  06/26/25       Pt to demo understanding of posture alignment  and demo ability to move left arm without hiking at shoulder and shows ability to move left shoulder without causing pain to the neck    Patient to be independent with home exercises for cervical and scapular area to increase ROM and stabilization for increased function    Patient to demonstrate awareness of neutral alignment for posture and understand strategies to use for sleeping, standing , sitting and functional activities to reduce symptoms and / or radiculopathy             PT Goal        Start:  06/05/25    Expected End:  07/17/25       Pt to have 50% less pain in neck and left shoulder    Patient to demonstrate increased strength by 1/3 MMT grade in areas of weakness for increased function and  reduced pain    Patient to have Quickdash of 4 points or more improvement for increased functional mobility

## 2025-06-24 ENCOUNTER — TREATMENT (OUTPATIENT)
Dept: PHYSICAL THERAPY | Facility: HOSPITAL | Age: 70
End: 2025-06-24
Payer: MEDICARE

## 2025-06-24 DIAGNOSIS — G89.29 CHRONIC LEFT SHOULDER PAIN: ICD-10-CM

## 2025-06-24 DIAGNOSIS — M54.2 CERVICALGIA: ICD-10-CM

## 2025-06-24 DIAGNOSIS — M25.512 CHRONIC LEFT SHOULDER PAIN: ICD-10-CM

## 2025-06-24 PROCEDURE — 97110 THERAPEUTIC EXERCISES: CPT | Mod: GP,CQ

## 2025-06-24 ASSESSMENT — PAIN - FUNCTIONAL ASSESSMENT: PAIN_FUNCTIONAL_ASSESSMENT: 0-10

## 2025-06-24 ASSESSMENT — PAIN SCALES - GENERAL
PAINLEVEL_OUTOF10: 0 - NO PAIN
PAINLEVEL_OUTOF10: 0 - NO PAIN

## 2025-06-24 NOTE — PROGRESS NOTES
"Physical Therapy    Physical Therapy Treatment    Patient Name: Tere Rod  MRN: [id number]  : 1955  Today's Date: 2025  Time Calculation  Start Time: 245  Stop Time: 326  Time Calculation (min): 41 min    PT Therapeutic Procedures Time Entry  Therapeutic Exercise Time Entry: 41          VISIT:# 7    Current Problem  Problem List Items Addressed This Visit           ICD-10-CM    Chronic left shoulder pain M25.512, G89.29    Cervicalgia M54.2        Subjective    Pt reports she was able to sleep with improved comfort last night using 1 pillow     Pain  Pain Assessment: 0-10  0-10 (Numeric) Pain Score: 0 - No pain  Pain Type: Chronic pain  Pain Location: Neck  Pain Orientation: Left  Multiple Pain Sites: Two  Pain Score 2: 0 - No pain  Pain Type 2: Chronic pain  Pain Location 2: Shoulder  Pain Orientation 2: Left       Objective    Pt was educated on a Melissa cervical roll to use.  We tried it here.  Pt will order one.  Gave pt additional stretches with OP for home to continue to improve her ROM.              Precautions  Precautions  STEADI Fall Risk Score (The score of 4 or more indicates an increased risk of falling): 0      Treatments:     EXERCISES                      Date  6-6-25  6/10/25  6/12/25 6/17/25 6/19/25 2025    VISIT# #2 #3 #4 #5 #6 #7     REPS REPS REPS                      pulleys   Flex, scaption  3 min  3 min  3 min   3 min 3 min  3 min 3 min  3 min 3 min  3 min 3'  3'   Seated neck stretches  Side bend  Rotation  Levator  Scalenes stretch    10 sec x 3   10 sec x 3  10 sec x 2 10 sec x 3   10 sec x 3  10 sec x 2    10 sec x 3   10 sec x 3  10 sec x 3    10 sec x 3   10 sec x 3  10 sec x 3   10 sec x 3   10 sec x 3  10 sec x 3   5x 5' op  5x 5\" op  5x 5\"   5x 5\" each   Cervical retraction  cues to sit up tall with a visual cues        cues to sit up tall with a visual cues     Seated shoulder elevate with neck alignment  10 x            Scapular stabilzation  Ball on wall " circles    Alphabet x 2  Alphabet x 2 Alphabet x 2 Alphabet x 2    Tbands  Rows  Lat pull  ER 2 hands      Green 2 x 10  Green 2 x 10    Green 2 x 10  Green 2 x 10    Blue 2 x 10  Blue 2 x 10   Blue 2 x 10  Blue 2 x 10  Blue 2 x 10    Shoulder flex, scaption, abd to 90       1 x 10 each     Sidelying ER w/ towel  ABD     Pronelying I, T        2 x 10 each     Posture education  For sewing and sleeping and ADLs  working on this at home            Retrain left shoulder flex  to reduce shoulder hiking   HEP           US left upper trap / neck        Heat   8 min 1.3 W/Cm2        8 min    8 min 1.3 W/Cm2        8 min    8 min 1.3 W/Cm2 8 min 1.3 W/Cm2 7 min 1.3 W/Cm2 Not needed    Manual left neck  8 min   supine  10 min supine  8 min supine 8 min supine 8 min  Not needed     posture educate  yes           HEP      Pec stretch mid/low  Ball on wall  Tband lat pulls and rows   Provide tband blue Pt is educated on tj roll and how to use.                              Assessment:   Pt tolerated treatment without complaint of increase in symptoms.  Pt required cues with new stretches.         Plan: Pt is off nice next week.  She will continue her exercises until next session   OP PT Plan  Treatment/Interventions: Education/ Instruction, Manual therapy, Neuromuscular re-education, Ultrasound, Therapeutic exercises  PT Plan: Skilled PT  PT Frequency: 2 times per week  Duration: 6 week  Onset Date: 06/01/24  Certification Period Start Date: 06/05/25  Certification Period End Date: 09/05/25    Goals:  Active       PT Problem       PT Goal        Start:  06/05/25    Expected End:  06/26/25       Pt to demo understanding of posture alignment  and demo ability to move left arm without hiking at shoulder and shows ability to move left shoulder without causing pain to the neck    Patient to be independent with home exercises for cervical and scapular area to increase ROM and stabilization for increased function    Patient to  demonstrate awareness of neutral alignment for posture and understand strategies to use for sleeping, standing , sitting and functional activities to reduce symptoms and / or radiculopathy             PT Goal        Start:  06/05/25    Expected End:  07/17/25       Pt to have 50% less pain in neck and left shoulder    Patient to demonstrate increased strength by 1/3 MMT grade in areas of weakness for increased function and reduced pain    Patient to have Quickdash of 4 points or more improvement for increased functional mobility

## 2025-07-08 ENCOUNTER — TREATMENT (OUTPATIENT)
Dept: PHYSICAL THERAPY | Facility: HOSPITAL | Age: 70
End: 2025-07-08
Payer: MEDICARE

## 2025-07-08 DIAGNOSIS — M54.2 CERVICALGIA: ICD-10-CM

## 2025-07-08 DIAGNOSIS — M25.512 CHRONIC LEFT SHOULDER PAIN: ICD-10-CM

## 2025-07-08 DIAGNOSIS — G89.29 CHRONIC LEFT SHOULDER PAIN: ICD-10-CM

## 2025-07-08 PROCEDURE — 97110 THERAPEUTIC EXERCISES: CPT | Mod: GP | Performed by: PHYSICAL THERAPIST

## 2025-07-08 ASSESSMENT — PAIN SCALES - GENERAL
PAINLEVEL_OUTOF10: 3
PAINLEVEL_OUTOF10: 0 - NO PAIN

## 2025-07-08 NOTE — PROGRESS NOTES
Physical Therapy    Physical Therapy Treatment      Patient Name: Tere Rod  MRN: [id number]  : 1955   Today's Date: 2025  Visit # 8  Time Calculation  Start Time: 1115  Stop Time: 1200  Time Calculation (min): 45 min    PT Therapeutic Procedures Time Entry  Therapeutic Exercise Time Entry: 45             Auth:     Current Problem  Problem List Items Addressed This Visit           ICD-10-CM    Chronic left shoulder pain M25.512, G89.29    Cervicalgia M54.2        Subjective    Name and  confirmed  2-3/10 neck,,  0/10 left shoulder  More aware of posture and feels it is helping  Pt would like to continue PT stretching ex      Pt has had hx of 1 year of Cervicalgia and left shoulder pain  Tere enjoys sewing and being active with her grandkids and hopes to reduce pain       Precautions  Precautions  STEADI Fall Risk Score (The score of 4 or more indicates an increased risk of falling): 0    Pain:  Pain Assessment  0-10 (Numeric) Pain Score: 3  Pain Type: Chronic pain  Pain Location: Neck  Pain Orientation: Left  Pain 2  Pain Score 2: 0 - No pain  Pain Type 2: Chronic pain  Pain Orientation 2: Left      Objective                Pt was educated on a stretching with gentle overpressure  Added levator stretch to HEP  Pt has blue tband and written HEP  Declined modality or manual , feeling better overall  Shoulder PREs 2# with mirror visual cue,   flex, scaption 10 x 2 ea    Melissa cervical roll is helpful .  Gave pt additional stretches with OP for home to continue to improve her ROM.     General Assessments:  Chronic pain: Left shoulder and neck .  Onset 3 years, worse in last 1 year. No prior surgeries to neck or shoulders.  Neck/left shoulder pain: Onset for for 1 year. No injury. Worse with reaching. Chronic tightness in the left side of neck. Intermittent  Left upper arm  radiation of pain.  Pain worse at night.     Functional Assessments:  Independent ambulator  Retired   Goes  "to [employer] center, yoga, elliptical  Enjoys gradchildren  Does fruit arrangements for parties  Sidesleeping  - advise in posture alignment  Sewing  - educate in body mechanics        Posture, forwards head,  hikes left shoulder with forwards flexion  or scaption or abduction motions     Alignment is within normal limits.  Moderate C5-6 and C6-7 spondylosis with disc height loss and  osteophytes. Vertebral body heights are preserved. Posterior elements  are intact. Prevertebral soft tissues are unremarkable.      IMPRESSION:  1. Moderate C5-6 and C6-7 spondylosis.  Shoulder left xray no significant changes       Treatments:        EXERCISES                     Date  6/12/25 6/17/25 6/19/25 6/24/2025 7-8-25   VISIT# #4 #5 #6 #7 #8     REPS                        pulleys   Flex, scaption 3 min  3 min 3 min  3 min 3 min  3 min 3'  3' 3'  3'   Seated neck stretches  Side bend  Rotation  Levator  Scalenes stretch    10 sec x 3   10 sec x 3  10 sec x 3    10 sec x 3   10 sec x 3  10 sec x 3    10 sec x 3   10 sec x 3  10 sec x 3    5x 5' op  5x 5\" op  5x 5\"   5x 5\" each   5x 5' op  5x 5\" op  5x 5\"   5x 5\" each     Cervical retraction     cues to sit up tall with a visual cues       Seated shoulder elevate with neck alignment            Scapular stabilzation  Ball on wall circles  Alphabet x 2 Alphabet x 2 Alphabet x 2      Tbands  Rows  Lat pull  ER 2 hands    Green 2 x 10  Green 2 x 10    Blue 2 x 10  Blue 2 x 10    Blue 2 x 10  Blue 2 x 10  Blue 2 x 10     HEP blue   Shoulder flex, scaption, abd to 90   1 x 10 each        Sidelying ER w/ towel  ABD     Pronelying I, T    2 x 10 each        Posture education  For sewing and sleeping and ADLs         working on this at home    Retrain left shoulder flex  to reduce shoulder hiking          Good    US left upper trap / neck        Heat    8 min 1.3 W/Cm2 8 min 1.3 W/Cm2 7 min 1.3 W/Cm2 Not needed  Discontinue no longer needed   Manual left neck  8 min supine 8 min supine 8 min  " Not needed  Discontinue no longer needed    posture educate mirror         Mirror  2# shoulder flex 10 x 2  Scaption 2# 10 x 2     HEP  Pec stretch mid/low  Ball on wall  Tband lat pulls and rows   Provide tband blue Pt is educated on tj roll and how to use.  [id number]    Progressed stretch to                            Assessment:   Pt tolerated treatment without complaint or increase in symptoms.  Pt has been making progress with posture and her awareness.     Plan:  recheck 7-10-25     Goals:  Active       PT Problem       PT Goal  (Met)       Start:  06/05/25    Expected End:  07/08/25    Resolved:  07/08/25    Pt to demo understanding of posture alignment  and demo ability to move left arm without hiking at shoulder and shows ability to move left shoulder without causing pain to the neck. Goal met    Patient to be independent with home exercises for cervical and scapular area to increase ROM and stabilization for increased function. Goal met    Patient to demonstrate awareness of neutral alignment for posture and understand strategies to use for sleeping, standing , sitting and functional activities to reduce symptoms and / or radiculopathy. Goal met             PT Goal        Start:  06/05/25    Expected End:  07/17/25       Pt to have 50% less pain in neck and left shoulder    Patient to demonstrate increased strength by 1/3 MMT grade in areas of weakness for increased function and reduced pain    Patient to have Quickdash of 4 points or more improvement for increased functional mobility

## 2025-07-10 ENCOUNTER — TREATMENT (OUTPATIENT)
Dept: PHYSICAL THERAPY | Facility: HOSPITAL | Age: 70
End: 2025-07-10
Payer: MEDICARE

## 2025-07-10 DIAGNOSIS — M25.512 CHRONIC LEFT SHOULDER PAIN: ICD-10-CM

## 2025-07-10 DIAGNOSIS — G89.29 CHRONIC LEFT SHOULDER PAIN: ICD-10-CM

## 2025-07-10 DIAGNOSIS — M54.2 CERVICALGIA: ICD-10-CM

## 2025-07-10 PROCEDURE — 97110 THERAPEUTIC EXERCISES: CPT | Mod: GP | Performed by: PHYSICAL THERAPIST

## 2025-07-10 ASSESSMENT — PAIN SCALES - GENERAL: PAINLEVEL_OUTOF10: 0 - NO PAIN

## 2025-07-10 NOTE — PROGRESS NOTES
Physical Therapy    Physical Therapy Treatment      Patient Name: Tere Rod  MRN: [id number]  : 1955   Today's Date: 7/10/2025  Visit # 9  Time Calculation  Start Time: 100  Stop Time: 145  Time Calculation (min): 45 min    PT Therapeutic Procedures Time Entry  Therapeutic Exercise Time Entry: 45             Auth:     Current Problem  Problem List Items Addressed This Visit           ICD-10-CM    Chronic left shoulder pain M25.512, G89.29    Cervicalgia M54.2        Subjective    Name and  confirmed  0/10 neck,,  0/10 left shoulder  More aware of posture and feels it is helping  Sleep undisturbed  Pt would like to continue PT stretching ex for a couple more visits     Pt has had hx of 1 year of Cervicalgia and left shoulder pain  Tere enjoys sewing and being active with her grandkids and hopes to reduce pain    Precautions  Precautions  STEADI Fall Risk Score (The score of 4 or more indicates an increased risk of falling): 0    Pain:  Pain Assessment  0-10 (Numeric) Pain Score: 0 - No pain  Pain Type: Chronic pain  Pain Location: Neck  Pain 2  Pain Type 2: Chronic pain      Objective        Other Measures  Disability of Arm Shoulder Hand (DASH): 19  Pt was educated on a stretching with gentle overpressure  Added levator stretch to HEP  Pt has blue tband and written HEP  Declined modality or manual , feeling better overall  Shoulder PREs 2# with mirror visual cue,   flex, scaption 10 x 2 ea     Melissa cervical roll is helpful .  Gave pt additional stretches with OP for home to continue to improve her ROM.      General Assessments:  Chronic pain: Left shoulder and neck .  Onset 3 years, worse in last 1 year. No prior surgeries to neck or shoulders.  Neck/left shoulder pain: Onset for for 1 year. No injury. Worse with reaching. Chronic tightness in the left side of neck. Intermittent  Left upper arm  radiation of pain.  Pain worse at night.     Functional Assessments:  Independent ambulator  Retired  "  Goes to [facility], yoga, elliptical  Enjoys gradchildren  Does fruit arrangements for parties  Sidesleeping  - advise in posture alignment  Sewing  - educate in body mechanics     Extremity/Trunk Assessments:      AROM Cervical; 7-10-25  Flex WNL   Side bend Rt 30    left 20 ,   side bend left with over pressure 25  Rotation Rt  45   left 50      AROM shoulder  Flex Rt 145  left 135   Abd WNL  IR RT full , left T8  (tight left)   ER Full symmetrical      MMT   Left shoulder 5/5  overall flex, scaption, abd, ER  Left shoulder 5/5 IR  Bicep 5/5 Hardik   Tricep 5/5 Hardik  Rt 5/5     Posture, forwards head,  hikes left shoulder with forwards flexion  or scaption or abduction motions     Alignment is within normal limits.  Moderate C5-6 and C6-7 spondylosis with disc height loss and  osteophytes. Vertebral body heights are preserved. Posterior elements  are intact. Prevertebral soft tissues are unremarkable.      IMPRESSION:  1. Moderate C5-6 and C6-7 spondylosis.  Shoulder left xray no significant changes      Outcome Measures:  Dash 22 = 25.00%        Treatments:        EXERCISES       recheck   Date 6/19/25 6/24/2025  7-8-25 7-10-25   VISIT# #6 #7 #8 #9                          pulleys   Flex, scaption 3 min  3 min 3'  3' 3'  3' 3'  3'   Seated neck stretches  Side bend  Rotation  Levator  Scalenes stretch    10 sec x 3   10 sec x 3  10 sec x 3    5x 5' op  5x 5\" op  5x 5\"   5x 5\" each    5x 5' op  5x 5\" op  5x 5\"   5x 5\" each      5x 5' op  5x 5\" op  5x 5\"   5x 5\" each   Cervical retraction cues to sit up tall with a visual cues         Seated shoulder elevate with neck alignment          Scapular stabilzation  Ball on wall circles Alphabet x 2        Tbands  Rows  Lat pull  ER 2 hands    Blue 2 x 10  Blue 2 x 10  Blue 2 x 10      HEP blue   Using Blue tband and HEP  10 x 2   Shoulder flex, scaption, abd to 90          Sidelying ER w/ towel  ABD     Pronelying I, T            Unable to lay prone due " to Gastric reflux   Posture education  For sewing and sleeping and ADLs     working on this at home  working on this at home    Retrain left shoulder flex  to reduce shoulder hiking      Good  Normal now   Wall slide shoulder and thoracis stretch    Core engaged pelvic tilt back to wall with UE     5 x              10 x 2   2# UE    US left upper trap / neck        Heat  7 min 1.3 W/Cm2 Not needed  Discontinue no longer needed Discontinue no longer needed   Manual left neck 8 min  Not needed  Discontinue no longer needed Discontinue no longer needed    posture educate mirror     Mirror  2# shoulder flex 10 x 2  Scaption 2# 10 x 2    Mirror at home, back to wall , core engaged    2# shoulder flex 10 x 2  Scaption 2# 10 x 2  2# abd    HEP Provide tband blue Pt is educated on tj roll and how to use.  [id number]     Progressed stretch to over pressure  [id number]      Pt would like couple more visits                            Assessment:  Pt tolerated treatment without complaint or increase in symptoms.  Pt has been making progress with posture and her awareness.  Good progress with no longer hiking at shoulder.  Cervical Left side bend limited compared to Rt.       Plan: Continue PT to educate and progress ROM side bend left ,  left shoulder assist IR      Goals:  Active       PT Problem       PT Goal  (Met)       Start:  06/05/25    Expected End:  07/08/25    Resolved:  07/08/25    Pt to demo understanding of posture alignment  and demo ability to move left arm without hiking at shoulder and shows ability to move left shoulder without causing pain to the neck. Goal met    Patient to be independent with home exercises for cervical and scapular area to increase ROM and stabilization for increased function. Goal met    Patient to demonstrate awareness of neutral alignment for posture and understand strategies to use for sleeping, standing , sitting and functional activities to reduce symptoms and / or radiculopathy.  Goal met             PT Goal        Start:  06/05/25    Expected End:  07/24/25       Pt to have 50% less pain in neck and left shoulder. Goal met    Patient to demonstrate increased strength by 1/3 MMT grade in areas of weakness for increased function and reduced pain. Goal met    Patient to have Quickdash of 4 points or more improvement for increased functional mobility  . progressing

## 2025-07-14 ENCOUNTER — TREATMENT (OUTPATIENT)
Dept: PHYSICAL THERAPY | Facility: HOSPITAL | Age: 70
End: 2025-07-14
Payer: MEDICARE

## 2025-07-14 DIAGNOSIS — G89.29 CHRONIC LEFT SHOULDER PAIN: ICD-10-CM

## 2025-07-14 DIAGNOSIS — M54.2 CERVICALGIA: ICD-10-CM

## 2025-07-14 DIAGNOSIS — M25.512 CHRONIC LEFT SHOULDER PAIN: ICD-10-CM

## 2025-07-14 PROCEDURE — 97110 THERAPEUTIC EXERCISES: CPT | Mod: GP | Performed by: PHYSICAL THERAPIST

## 2025-07-14 ASSESSMENT — PAIN SCALES - GENERAL: PAINLEVEL_OUTOF10: 0 - NO PAIN

## 2025-07-14 NOTE — PROGRESS NOTES
Physical Therapy    Physical Therapy Treatment      Patient Name: Tere Rod  MRN: [id number]  : 1955   Today's Date: 2025  Visit # 10  Time Calculation  Start Time: 0800  Stop Time: 0845  Time Calculation (min): 45 min    PT Therapeutic Procedures Time Entry  Therapeutic Exercise Time Entry: 45             Auth:     Current Problem  Problem List Items Addressed This Visit           ICD-10-CM    Chronic left shoulder pain M25.512, G89.29    Cervicalgia M54.2        Subjective    Name and  confirmed  0/10 neck,,  0/10 left shoulder  More aware of posture and feels it is helping  Sleep undisturbed  possible DC next visit     Pt has had hx of 1 year of Cervicalgia and left shoulder pain  Tere enjoys sewing and being active with her grandkids and hopes to reduce pain       Precautions  Precautions  STEADI Fall Risk Score (The score of 4 or more indicates an increased risk of falling): 0    Pain:  Pain Assessment  0-10 (Numeric) Pain Score: 0 - No pain  Pain Type: Chronic pain  Pain Location: Neck  Pain 2  Pain Type 2: Chronic pain      Objective         General Assessments:  Chronic pain: Left shoulder and neck .  Onset 3 years, worse in last 1 year. No prior surgeries to neck or shoulders.  Neck/left shoulder pain: Onset for for 1 year. No injury. Worse with reaching. Chronic tightness in the left side of neck. Intermittent  Left upper arm  radiation of pain.  Pain worse at night.     Functional Assessments:  Independent ambulator  Retired   Goes to [facility] [employer], yoga, elliptical  Enjoys gradchildren  Does fruit arrangements for parties  Sidesleeping  - advise in posture alignment  Sewing  - educate in body mechanics     Extremity/Trunk Assessments:      AROM Cervical; 7-10-25  Flex WNL   Side bend Rt 30    left 20 ,   side bend left with over pressure 25  Rotation Rt  45   left 50      AROM shoulder  Flex Rt 145  left 135   Abd WNL  IR RT full , left T8    ER Full symmetrical     "  MMT   Left shoulder 5/5  overall flex, scaption, abd, ER  Left shoulder 5/5 IR  Bicep 5/5 Hardik   Tricep 5/5 Hardik  Rt 5/5    Posture, forwards head,  hikes left shoulder with forwards flexion  or scaption or abduction motions     Alignment is within normal limits.  Moderate C5-6 and C6-7 spondylosis with disc height loss and  osteophytes. Vertebral body heights are preserved. Posterior elements  are intact. Prevertebral soft tissues are unremarkable.      IMPRESSION:  1. Moderate C5-6 and C6-7 spondylosis.  Shoulder left xray no significant changes             Treatments:              recheck    6/19/25 6/24/2025  7-8-25 7-10-25 7-14-25   #6 #7 #8 #9 #10                         3 min  3 min 3'  3' 3'  3' 3'  3' 3'  3'      10 sec x 3   10 sec x 3  10 sec x 3    5x 5' op  5x 5\" op  5x 5\"   5x 5\" each    5x 5' op  5x 5\" op  5x 5\"   5x 5\" each       5x 5' op  5x 5\" op  5x 5\"   5x 5\" each   5x 5' op  5x 5\" op  5x 5\"   5x 5\" each   cues to sit up tall with a visual cues            thoracic stretch with wall      Wall fabrice with core engaged        Prone  Row and I ( modified stand over physioball         10 sec x 3          10 x   Cues   Review next          Green ball in chair to modify prone row and I . 2#  But pt tolerates tband better    DC this ex      Blue 2 x 10  Blue 2 x 10  Blue 2 x 10      HEP blue    Using Blue tband and HEP  10 x 2   Using Blue tband and HEP  10 x 2                          Unable to lay prone due to Gastric reflux     Unable to lay prone due to Gastric reflu       working on this at home  working on this at home         Good  Normal now WNL         5 x                    10 x 2   2# UE  5x               10 x 2  2# UE alternate Hardik   7 min 1.3 W/Cm2 Not needed  Discontinue no longer needed Discontinue no longer needed Discontinue no longer needed   8 min  Not needed  Discontinue no longer needed Discontinue no longer needed DC  Not needed       Mirror  2# shoulder flex 10 x 2  Scaption 2# 10 x " 2    Mirror at home, back to wall , core engaged     2# shoulder flex 10 x 2  Scaption 2# 10 x 2  2# abd   back to wall , core engaged     2# shoulder flex 10 x 2  Scaption 2# 10 x 2  2# abd   4# bicep   Provide tband blue Pt is educated on tj roll and how to use.  [id number]     Progressed stretch to over pressure  [id number]        Pt would like couple more visits  [id number]                            Assessment:  Pt tolerated treatment without complaint or increase in symptoms. Pt has been making progress with posture and her awareness. Good progress with no longer hiking at shoulder. Cervical Left side bend limited compared to Rt.  Next visit finalize HEP and review the added thoracic stretch and door way stretch. Hold off on wall fabrice     Plan: 7-21-25 recheck and consider discharge     Goals:  Active       PT Problem       PT Goal  (Met)       Start:  06/05/25    Expected End:  07/08/25    Resolved:  07/08/25    Pt to demo understanding of posture alignment  and demo ability to move left arm without hiking at shoulder and shows ability to move left shoulder without causing pain to the neck. Goal met    Patient to be independent with home exercises for cervical and scapular area to increase ROM and stabilization for increased function. Goal met    Patient to demonstrate awareness of neutral alignment for posture and understand strategies to use for sleeping, standing , sitting and functional activities to reduce symptoms and / or radiculopathy. Goal met             PT Goal        Start:  06/05/25    Expected End:  07/24/25       Pt to have 50% less pain in neck and left shoulder. Goal met    Patient to demonstrate increased strength by 1/3 MMT grade in areas of weakness for increased function and reduced pain. Goal met    Patient to have Quickdash of 4 points or more improvement for increased functional mobility  . progressing

## 2025-07-16 ENCOUNTER — HOSPITAL ENCOUNTER (EMERGENCY)
Facility: HOSPITAL | Age: 70
Discharge: HOME | End: 2025-07-16
Payer: MEDICARE

## 2025-07-16 ENCOUNTER — APPOINTMENT (OUTPATIENT)
Dept: RADIOLOGY | Facility: HOSPITAL | Age: 70
End: 2025-07-16
Payer: MEDICARE

## 2025-07-16 ENCOUNTER — APPOINTMENT (OUTPATIENT)
Dept: CARDIOLOGY | Facility: HOSPITAL | Age: 70
End: 2025-07-16
Payer: MEDICARE

## 2025-07-16 VITALS
HEART RATE: 70 BPM | RESPIRATION RATE: 20 BRPM | HEIGHT: 63 IN | TEMPERATURE: 98.6 F | SYSTOLIC BLOOD PRESSURE: 140 MMHG | OXYGEN SATURATION: 98 % | BODY MASS INDEX: 21.62 KG/M2 | DIASTOLIC BLOOD PRESSURE: 70 MMHG | WEIGHT: 122 LBS

## 2025-07-16 DIAGNOSIS — R07.9 CHEST PAIN, UNSPECIFIED TYPE: Primary | ICD-10-CM

## 2025-07-16 LAB
ALBUMIN SERPL BCP-MCNC: 3.9 G/DL (ref 3.4–5)
ALP SERPL-CCNC: 44 U/L (ref 33–136)
ALT SERPL W P-5'-P-CCNC: 14 U/L (ref 7–45)
ANION GAP SERPL CALC-SCNC: 11 MMOL/L (ref 10–20)
AST SERPL W P-5'-P-CCNC: 15 U/L (ref 9–39)
BASOPHILS # BLD AUTO: 0.03 X10*3/UL (ref 0–0.1)
BASOPHILS NFR BLD AUTO: 0.5 %
BILIRUB SERPL-MCNC: 0.4 MG/DL (ref 0–1.2)
BUN SERPL-MCNC: 21 MG/DL (ref 6–23)
CALCIUM SERPL-MCNC: 8.8 MG/DL (ref 8.6–10.3)
CARDIAC TROPONIN I PNL SERPL HS: 3 NG/L (ref 0–13)
CARDIAC TROPONIN I PNL SERPL HS: <3 NG/L (ref 0–13)
CHLORIDE SERPL-SCNC: 106 MMOL/L (ref 98–107)
CO2 SERPL-SCNC: 26 MMOL/L (ref 21–32)
CREAT SERPL-MCNC: 0.74 MG/DL (ref 0.5–1.05)
EGFRCR SERPLBLD CKD-EPI 2021: 87 ML/MIN/1.73M*2
EOSINOPHIL # BLD AUTO: 0.12 X10*3/UL (ref 0–0.4)
EOSINOPHIL NFR BLD AUTO: 1.8 %
ERYTHROCYTE [DISTWIDTH] IN BLOOD BY AUTOMATED COUNT: 13 % (ref 11.5–14.5)
GLUCOSE SERPL-MCNC: 101 MG/DL (ref 74–99)
HCT VFR BLD AUTO: 41.3 % (ref 36–46)
HGB BLD-MCNC: 13.9 G/DL (ref 12–16)
IMM GRANULOCYTES # BLD AUTO: 0.02 X10*3/UL (ref 0–0.5)
IMM GRANULOCYTES NFR BLD AUTO: 0.3 % (ref 0–0.9)
LYMPHOCYTES # BLD AUTO: 1.83 X10*3/UL (ref 0.8–3)
LYMPHOCYTES NFR BLD AUTO: 27.8 %
MCH RBC QN AUTO: 30.3 PG (ref 26–34)
MCHC RBC AUTO-ENTMCNC: 33.7 G/DL (ref 32–36)
MCV RBC AUTO: 90 FL (ref 80–100)
MONOCYTES # BLD AUTO: 0.49 X10*3/UL (ref 0.05–0.8)
MONOCYTES NFR BLD AUTO: 7.4 %
NEUTROPHILS # BLD AUTO: 4.1 X10*3/UL (ref 1.6–5.5)
NEUTROPHILS NFR BLD AUTO: 62.2 %
NRBC BLD-RTO: 0 /100 WBCS (ref 0–0)
PLATELET # BLD AUTO: 245 X10*3/UL (ref 150–450)
POTASSIUM SERPL-SCNC: 4.2 MMOL/L (ref 3.5–5.3)
PROT SERPL-MCNC: 6.9 G/DL (ref 6.4–8.2)
RBC # BLD AUTO: 4.58 X10*6/UL (ref 4–5.2)
SODIUM SERPL-SCNC: 139 MMOL/L (ref 136–145)
WBC # BLD AUTO: 6.6 X10*3/UL (ref 4.4–11.3)

## 2025-07-16 PROCEDURE — 2500000001 HC RX 250 WO HCPCS SELF ADMINISTERED DRUGS (ALT 637 FOR MEDICARE OP): Performed by: NURSE PRACTITIONER

## 2025-07-16 PROCEDURE — 99285 EMERGENCY DEPT VISIT HI MDM: CPT | Mod: 25

## 2025-07-16 PROCEDURE — 84484 ASSAY OF TROPONIN QUANT: CPT | Performed by: NURSE PRACTITIONER

## 2025-07-16 PROCEDURE — 2500000004 HC RX 250 GENERAL PHARMACY W/ HCPCS (ALT 636 FOR OP/ED): Performed by: NURSE PRACTITIONER

## 2025-07-16 PROCEDURE — 80053 COMPREHEN METABOLIC PANEL: CPT | Performed by: NURSE PRACTITIONER

## 2025-07-16 PROCEDURE — 36415 COLL VENOUS BLD VENIPUNCTURE: CPT | Performed by: NURSE PRACTITIONER

## 2025-07-16 PROCEDURE — 71046 X-RAY EXAM CHEST 2 VIEWS: CPT

## 2025-07-16 PROCEDURE — 71046 X-RAY EXAM CHEST 2 VIEWS: CPT | Performed by: RADIOLOGY

## 2025-07-16 PROCEDURE — 96374 THER/PROPH/DIAG INJ IV PUSH: CPT

## 2025-07-16 PROCEDURE — 93005 ELECTROCARDIOGRAM TRACING: CPT

## 2025-07-16 PROCEDURE — 85025 COMPLETE CBC W/AUTO DIFF WBC: CPT | Performed by: NURSE PRACTITIONER

## 2025-07-16 RX ORDER — ONDANSETRON HYDROCHLORIDE 2 MG/ML
4 INJECTION, SOLUTION INTRAVENOUS ONCE
Status: COMPLETED | OUTPATIENT
Start: 2025-07-16 | End: 2025-07-16

## 2025-07-16 RX ORDER — NAPROXEN SODIUM 220 MG/1
324 TABLET, FILM COATED ORAL ONCE
Status: COMPLETED | OUTPATIENT
Start: 2025-07-16 | End: 2025-07-16

## 2025-07-16 RX ADMIN — ONDANSETRON 4 MG: 2 INJECTION, SOLUTION INTRAMUSCULAR; INTRAVENOUS at 11:52

## 2025-07-16 RX ADMIN — ASPIRIN 81 MG CHEWABLE TABLET 324 MG: 81 TABLET CHEWABLE at 11:52

## 2025-07-16 ASSESSMENT — HEART SCORE
RISK FACTORS: 1-2 RISK FACTORS
RISK FACTORS: 1-2 RISK FACTORS
HISTORY: MODERATELY SUSPICIOUS
ECG: NORMAL
HEART SCORE: 4
HISTORY: MODERATELY SUSPICIOUS
ECG: NORMAL
AGE: 65+
AGE: 65+
TROPONIN: LESS THAN OR EQUAL TO NORMAL LIMIT

## 2025-07-16 ASSESSMENT — LIFESTYLE VARIABLES
HAVE PEOPLE ANNOYED YOU BY CRITICIZING YOUR DRINKING: NO
TOTAL SCORE: 0
EVER HAD A DRINK FIRST THING IN THE MORNING TO STEADY YOUR NERVES TO GET RID OF A HANGOVER: NO
EVER FELT BAD OR GUILTY ABOUT YOUR DRINKING: NO
HAVE YOU EVER FELT YOU SHOULD CUT DOWN ON YOUR DRINKING: NO

## 2025-07-16 ASSESSMENT — PAIN SCALES - GENERAL: PAINLEVEL_OUTOF10: 5 - MODERATE PAIN

## 2025-07-16 ASSESSMENT — PAIN - FUNCTIONAL ASSESSMENT: PAIN_FUNCTIONAL_ASSESSMENT: 0-10

## 2025-07-16 ASSESSMENT — VISUAL ACUITY: OU: 1

## 2025-07-16 NOTE — Clinical Note
Tere Rod was seen and treated in our emergency department on 7/16/2025.  She may return to work on 07/19/2025.       If you have any questions or concerns, please don't hesitate to call.      Teresa Vitale, APRN-CNP

## 2025-07-16 NOTE — ED PROVIDER NOTES
"    HPI   Chief Complaint   Patient presents with    left shoulder pain    Hot Flashes       Patient presents the emergency department for evaluation of chest pain.  Patient reports she was driving when she got a sensation of chest pain that went across her upper chest from her left shoulder to her right shoulder.  This was associated with a hot flash, palpitations at an irregular rate and nausea giving her concern for atypical chest pain in a woman.  Her mother has a history of atrial fibrillation however the patient does not.  She denies a history of hypertension, hyperlipidemia, PE, DVT, being a smoker, recent surgery, hormone use or long distance travel.  She admits to being prediabetic with A1c of 5.9.  She took a dose of the ibuprofen for the shoulder as she does have some chronic left shoulder pain and it did not change her symptoms at all causing her to seek evaluation.  She denies any associated traumatic incident and the pain is not aggravated by movement of the shoulder or palpation of the chest.  There is no dizziness, headache, vision change, shortness of breath, cough, vomiting, abdominal pain, numbness, weakness, UTI symptoms, calf pain, leg swelling or rash.  She did have family over for a large 4 July party of about 60 people in a lot of children.  She does not feel that that is the cause of her symptoms.      History provided by:  Patient   used: No                          [place] Coma Scale Score: 15   HEART Score: 4                Patient History   Medical History[1]  Surgical History[2]  Family History[3]  Social History[4]    Physical Exam   Visit Vitals  /70 (BP Location: Left arm, Patient Position: Sitting)   Pulse 70   Temp 37 °C (98.6 °F) (Tympanic)   Resp 20   Ht 1.6 m (5' 3\")   Wt 55.3 kg (122 lb)   SpO2 98%   BMI 21.61 kg/m²   OB Status Postmenopausal   Smoking Status Never   BSA 1.57 m²      Physical Exam  Vitals reviewed.   Constitutional:       General: She " is not in acute distress.     Appearance: She is not toxic-appearing.   HENT:      Head: Normocephalic and atraumatic.      Right Ear: Hearing and external ear normal.      Left Ear: Hearing and external ear normal.      Nose: Nose normal.      Mouth/Throat:      Lips: Pink.      Mouth: Mucous membranes are moist.      Pharynx: Oropharynx is clear.     Eyes:      General: Vision grossly intact. No scleral icterus.    Neck:      Vascular: No JVD.     Cardiovascular:      Rate and Rhythm: Normal rate and regular rhythm.      Pulses:           Radial pulses are 2+ on the left side.      Heart sounds: No murmur heard.     Comments: Auscultated rate and rhythm not suspicious for atrial fibrillation.  No calf tenderness, palpable cords or localized foot/ankle edema bilaterally.  Pulmonary:      Effort: Pulmonary effort is normal.      Breath sounds: Normal breath sounds. No wheezing or rhonchi.   Abdominal:      General: Bowel sounds are normal.      Palpations: Abdomen is soft.      Tenderness: There is no abdominal tenderness.     Musculoskeletal:      Cervical back: Normal range of motion and neck supple.      Right lower leg: No edema.      Left lower leg: No edema.      Comments: NIKIA mcleod.     Skin:     General: Skin is warm and dry.      Capillary Refill: Capillary refill takes less than 2 seconds.     Neurological:      General: No focal deficit present.      Mental Status: She is alert and oriented to person, place, and time.      Sensory: Sensation is intact.      Motor: Motor function is intact.      Coordination: Coordination is intact.      Gait: Gait is intact.     Psychiatric:         Behavior: Behavior is cooperative.         XR chest 2 views   Final Result   1.  No evidence of acute cardiopulmonary process.                  MACRO:   None.        Signed by: Christian Randolph 7/16/2025 2:02 PM   Dictation workstation:   [id number]          Labs Reviewed   COMPREHENSIVE METABOLIC PANEL - Abnormal        Result Value    Glucose 101 (*)     Sodium 139      Potassium 4.2      Chloride 106      Bicarbonate 26      Anion Gap 11      Urea Nitrogen 21      Creatinine 0.74      eGFR 87      Calcium 8.8      Albumin 3.9      Alkaline Phosphatase 44      Total Protein 6.9      AST 15      Bilirubin, Total 0.4      ALT 14     SERIAL TROPONIN-INITIAL - Normal    Troponin I, High Sensitivity 3      Narrative:     Less than 99th percentile of normal range cutoff-  Female and children under 18 years old <14 ng/L; Male <21 ng/L: Negative  Repeat testing should be performed if clinically indicated.     Female and children under 18 years old 14-50 ng/L; Male 21-50 ng/L:  Consistent with possible cardiac damage and possible increased clinical   risk. Serial measurements may help to assess extent of myocardial damage.     >50 ng/L: Consistent with cardiac damage, increased clinical risk and  myocardial infarction. Serial measurements may help assess extent of   myocardial damage.      NOTE: Children less than 1 year old may have higher baseline troponin   levels and results should be interpreted in conjunction with the overall   clinical context.     NOTE: Troponin I testing is performed using a different   testing methodology at [facility] than at other   [facility]. Direct result comparisons should only   be made within the same method.   SERIAL TROPONIN, 1 HOUR - Normal    Troponin I, High Sensitivity <3      Narrative:     Less than 99th percentile of normal range cutoff-  Female and children under 18 years old <14 ng/L; Male <21 ng/L: Negative  Repeat testing should be performed if clinically indicated.     Female and children under 18 years old 14-50 ng/L; Male 21-50 ng/L:  Consistent with possible cardiac damage and possible increased clinical   risk. Serial measurements may help to assess extent of myocardial damage.     >50 ng/L: Consistent with cardiac damage, increased clinical risk and  myocardial  infarction. Serial measurements may help assess extent of   myocardial damage.      NOTE: Children less than 1 year old may have higher baseline troponin   levels and results should be interpreted in conjunction with the overall   clinical context.     NOTE: Troponin I testing is performed using a different   testing methodology at [facility] than at other   [facility]. Direct result comparisons should only   be made within the same method.   CBC WITH AUTO DIFFERENTIAL    WBC 6.6      nRBC 0.0      RBC 4.58      Hemoglobin 13.9      Hematocrit 41.3      MCV 90      MCH 30.3      MCHC 33.7      RDW 13.0      Platelets 245      Neutrophils % 62.2      Immature Granulocytes %, Automated 0.3      Lymphocytes % 27.8      Monocytes % 7.4      Eosinophils % 1.8      Basophils % 0.5      Neutrophils Absolute 4.10      Immature Granulocytes Absolute, Automated 0.02      Lymphocytes Absolute 1.83      Monocytes Absolute 0.49      Eosinophils Absolute 0.12      Basophils Absolute 0.03     TROPONIN SERIES- (INITIAL, 1 HR)    Narrative:     The following orders were created for panel order Troponin I Series, High Sensitivity (0, 1 HR).  Procedure                               Abnormality         Status                     ---------                               -----------         ------                     Troponin I, High Sensiti...[[id number]]  Normal              Final result               Troponin, High Sensitivi...[[id number]]  Normal              Final result                 Please view results for these tests on the individual orders.       No results found for this or any previous visit (from the past 4464 hours).    ED Course & MDM     Medical Decision Making  Patient presents the emergency department for evaluation of chest pain.  Differential diagnosis of NSTEMI, paroxysmal arrhythmia, exacerbation of chronic shoulder pain and anxiety (per patient) to mention a few.  Plan is for EKG, chest x-ray,  baseline labs including high-sensitivity troponin and dose of aspirin with Zofran.  Patient provides consent and is to be evaluated by ER attending as well.  Chart review does show a history of his CT angio of the coronaries which does a scoring of 0 in 2022.        ED Course as of 07/16/25 1437   Wed Jul 16, 2025   1134 IMPRESSION:  1. Coronary artery calcium score of  0 completed in November 2022. [NA]   1242 Troponin I, High Sensitivity: 3 [JM]   1408 IMPRESSION:  1.  No evidence of acute cardiopulmonary process.   [NA]   1422 Patient reevaluated by Dr. Jason.  Patient does feel improved.  High-sensitivity troponin of 3 with negative delta.  Chest x-ray as interpreted by radiologist negative for acute cardiopulmonary process.  Heart score 4.  Patient was offered inpatient versus outpatient treatment and management which she prefers the latter.  Plan is for close monitoring of symptoms, stress testing which patient will be called for testing next business day and strict return precautions as well as outpatient follow-up with primary care. Patient is non-toxic, not hypoxic and appropriate for this outpatient management plan which they prefer. Encouragement to arrange close follow up was discussed as well as provided in a written handout of discharge instructions. Patient was educated on signs of symptoms to watch for indicative of re-evaluation in the emergency department setting to include any worsening of current symptoms. They verbalized understanding of instructions and is amenable to this treatment plan with no social determinants of health that would obscure this plan. Patient departed in stable condition.  [NA]      ED Course User Index  [JM] Ruperto Jason MD  [NA] Teresa Vitale, APRN-CNP         Diagnoses as of 07/16/25 1437   Chest pain, unspecified type          Your medication list        ASK your doctor about these medications        Instructions Last Dose Given Next Dose Due    cholecalciferol 125 mcg (5,000 units) tablet  Commonly known as: Vitamin D-3           Culturelle 10 billion cell capsule  Generic drug: lactobacillus           cyanocobalamin 1,000 mcg tablet  Commonly known as: Vitamin B-12           dorzolamide 2 % ophthalmic solution  Commonly known as: Trusopt           estradiol 0.01 % (0.1 mg/gram) vaginal cream  Commonly known as: Estrace      Apply topically 2x per week       hydrOXYzine HCL 10 mg tablet  Commonly known as: Atarax           latanoprost 0.005 % ophthalmic solution  Commonly known as: Xalatan           loratadine 10 mg tablet  Commonly known as: Claritin           omeprazole 40 mg DR capsule  Commonly known as: PriLOSEC           phytonadione 100 mcg tablet  Commonly known as: Vitamin K                    Procedure  None     *This report was transcribed using voice recognition software.  Every effort was made to ensure accuracy; however, inadvertent computerized transcription errors may be present.*  Teresa Vitale, JEANNIE-CNP  07/16/25         Teresa Vitale, JEANNIE-CNP  07/16/25 1423         [1]   Past Medical History:  Diagnosis Date    Cystocele, unspecified (CODE) 02/05/2019    Vaginal prolapse    Encounter for follow-up examination after completed treatment for conditions other than malignant neoplasm 09/29/2020    Postop check    Other conditions influencing health status 09/29/2020    History of rectocele    Personal history of cervical dysplasia 11/19/2020    History of cervical dysplasia    Personal history of gestational diabetes 10/05/2020    History of gestational diabetes mellitus (GDM)    Personal history of other (healed) physical injury and trauma 01/24/2019    History of kidney injury    Personal history of other diseases of urinary system     History of prolapse of bladder    Personal history of other specified conditions 01/22/2020    History of urinary urgency    Urinary tract infection, site not specified 08/25/2020    Acute UTI     Uterovaginal prolapse, unspecified 09/29/2020    Cystocele with uterine descensus   [2]   Past Surgical History:  Procedure Laterality Date    HYSTERECTOMY  2/15/2017    OTHER SURGICAL HISTORY  12/27/2018    Kidney surgery    OTHER SURGICAL HISTORY  12/27/2018    Tubal ligation    OTHER SURGICAL HISTORY  11/22/2021    Eye surgery    OTHER SURGICAL HISTORY  09/02/2020    Sacrocolpopexy    OTHER SURGICAL HISTORY  09/02/2020    Hysterectomy supracervical    OTHER SURGICAL HISTORY  09/02/2020    Mid-urethral sling insertion   [3]   Family History  Problem Relation Name Age of Onset    Diabetes Mother Radha 60 - 69    Hypertension Mother Radha 60 - 69    Colon cancer Mother Radha 80 - 99    Coronary artery disease Mother Radha 70 - 79    Diabetes Father Juan 60 - 69    Hypertension Father Juan 50 - 59    Coronary artery disease Father Juan 50 - 59    Heart attack Father Juan 80 - 99    Esophageal cancer Father Juan 80 - 99    Thyroid disease Father Juan 60 - 69    Diabetes Brother Desean 50 - 59    Hypertension Brother Desean 40 - 49    Hypertension Maternal Grandfather John 40 - 49    Coronary artery disease Paternal Grandfather Irvin 70 - 79    Colon cancer Paternal Grandmother Sepideh 70 - 79    Diabetes Brother Anshu    [4]   Social History  Tobacco Use    Smoking status: Never    Smokeless tobacco: Never   Substance Use Topics    Alcohol use: Not Currently     Comment: rarely    Drug use: Never        Teresa Vitale, JEANNIE-PLACIDO  07/16/25 [id number]

## 2025-07-17 ENCOUNTER — HOSPITAL ENCOUNTER (OUTPATIENT)
Dept: CARDIOLOGY | Facility: HOSPITAL | Age: 70
Discharge: HOME | End: 2025-07-17
Payer: MEDICARE

## 2025-07-17 DIAGNOSIS — R07.9 CHEST PAIN, UNSPECIFIED: ICD-10-CM

## 2025-07-17 PROCEDURE — 93350 STRESS TTE ONLY: CPT | Performed by: INTERNAL MEDICINE

## 2025-07-17 PROCEDURE — 2500000004 HC RX 250 GENERAL PHARMACY W/ HCPCS (ALT 636 FOR OP/ED): Performed by: NURSE PRACTITIONER

## 2025-07-17 PROCEDURE — 93018 CV STRESS TEST I&R ONLY: CPT | Performed by: INTERNAL MEDICINE

## 2025-07-17 PROCEDURE — 93017 CV STRESS TEST TRACING ONLY: CPT

## 2025-07-17 PROCEDURE — 93016 CV STRESS TEST SUPVJ ONLY: CPT | Performed by: INTERNAL MEDICINE

## 2025-07-17 RX ADMIN — PERFLUTREN 8 ML OF DILUTION: 6.52 INJECTION, SUSPENSION INTRAVENOUS at 09:48

## 2025-07-17 NOTE — NURSING NOTE
Your ER discharge stress test was read as normal.  Please follow up within 1 month with your primary care physician.  Return to ED if CP re-occurs and lasts for more than 10-15 minutes.

## 2025-07-21 ENCOUNTER — TREATMENT (OUTPATIENT)
Dept: PHYSICAL THERAPY | Facility: HOSPITAL | Age: 70
End: 2025-07-21
Payer: MEDICARE

## 2025-07-21 DIAGNOSIS — G89.29 CHRONIC LEFT SHOULDER PAIN: ICD-10-CM

## 2025-07-21 DIAGNOSIS — M25.512 CHRONIC LEFT SHOULDER PAIN: ICD-10-CM

## 2025-07-21 DIAGNOSIS — M54.2 CERVICALGIA: ICD-10-CM

## 2025-07-21 PROCEDURE — 97110 THERAPEUTIC EXERCISES: CPT | Mod: GP | Performed by: PHYSICAL THERAPIST

## 2025-07-21 ASSESSMENT — PAIN SCALES - GENERAL: PAINLEVEL_OUTOF10: 0 - NO PAIN

## 2025-07-21 NOTE — PROGRESS NOTES
Physical Therapy    Physical Therapy Treatment and Discharge     Patient Name: Tere Rod  MRN: [id number]  : 1955   Today's Date: 2025  Visit # 11  Time Calculation  Start Time: 815  Stop Time: 09  Time Calculation (min): 45 min    PT Therapeutic Procedures Time Entry  Therapeutic Exercise Time Entry: 45  Date of Discharge 25             Auth:     Current Problem  Problem List Items Addressed This Visit           ICD-10-CM    Chronic left shoulder pain M25.512, G89.29    Cervicalgia M54.2        Subjective    Name and  confirmed  0/10 neck,,  0/10 left shoulder  More aware of posture and feels it is helping  Sleep undisturbed   DC  PT    Pt has had hx of 1 year of Cervicalgia and left shoulder pain  Tere enjoys sewing and being active with her grandkids and hopes to reduce pain          Precautions  Precautions  STEADI Fall Risk Score (The score of 4 or more indicates an increased risk of falling): 0    Pain:  Pain Assessment  0-10 (Numeric) Pain Score: 0 - No pain  Pain Type: Chronic pain  Pain Location: Neck  Pain 2  Pain Type 2: Chronic pain      Objective      General Assessments:  Chronic pain: Left shoulder and neck .  Onset 3 years, worse in last 1 year. No prior surgeries to neck or shoulders.  Neck/left shoulder pain: Onset for for 1 year. No injury. Worse with reaching. Chronic tightness in the left side of neck.      Functional Assessments:  Independent ambulator  Retired   Goes to [employer], yoga, elliptical  Enjoys gradchildren  Does fruit arrangements for parties  Sidesleeping  - advise in posture alignment  Sewing  - educate in body mechanics     Extremity/Trunk Assessments:      AROM Cervical;  Flex WNL   Side bend Rt 30    left 25  Rotation Rt  45   left 50      AROM shoulder  Flex Rt 145  left 135   Abd WNL  IR RT full , left T8    ER Full symmetrical      MMT   Left shoulder 5/5  overall flex, scaption, abd, ER  Left shoulder 5/5 IR  Bicep 5/5 Hardik  "  Tricep 5/5 Hardik  Rt 5/5     Posture, forwards head,  hikes left shoulder with forwards flexion  or scaption or abduction motions     Alignment is within normal limits.  Moderate C5-6 and C6-7 spondylosis with disc height loss and  osteophytes. Vertebral body heights are preserved. Posterior elements  are intact. Prevertebral soft tissues are unremarkable.      IMPRESSION:  1. Moderate C5-6 and C6-7 spondylosis.  Shoulder left xray no significant changes    Other Measures  Disability of Arm Shoulder Hand (DASH): 13             Treatments:          recheck   DC   6/19/25 7-10-25 7-14-25 7-21-25   #6 #9 #10 #11                     3 min  3 min 3'  3' 3'  3'       10 sec x 3   10 sec x 3  10 sec x 3    5x 5' op  5x 5\" op  5x 5\"   5x 5\" each    5x 5' op  5x 5\" op  5x 5\"   5x 5\" each    cues to sit up tall with a visual cues          thoracic stretch with wall        Wall fabrice with core engaged           Prone  Row and I ( modified stand over physioball     10 sec x 3              10 x   Cues   Review next              Green ball in chair to modify prone row and I . 2#  But pt tolerates tband better     DC this ex Recheck and educate in final instructions.    If goes to gym plan to do low weight , rows and pull downs    Pt questions were answered        Blue 2 x 10  Blue 2 x 10  Blue 2 x 10    Using Blue tband and HEP  10 x 2    Using Blue tband and HEP  10 x 2   Using Blue tband and HEP  10 x 2                    Unable to lay prone due to Gastric reflux       Unable to lay prone due to Gastric reflu      working on this at home         Normal now WNL      5 x                    10 x 2   2# UE  5x                     10 x 2  2# UE alternate Hardik   5x            10 x 2  2# UE alternate Hardik   7 min 1.3 W/Cm2 Discontinue no longer needed Discontinue no longer needed    8 min  Discontinue no longer needed DC  Not needed      Mirror at home, back to wall , core engaged     2# shoulder flex 10 x 2  Scaption 2# 10 x 2  2# abd  "  back to wall , core engaged     2# shoulder flex 10 x 2  Scaption 2# 10 x 2  2# abd   4# bicep back to wall , core engaged     2# shoulder flex 10 x 2  Scaption 2# 10 x 2  2# abd   4# bicep   Provide tband blue [id number]        Pt would like couple more visits  [id number]                  Assessment:    Pt Met goals.  Pt has been making progress with posture and her awareness. Good progress with no longer hiking at shoulder.  Goals Met.  Pt feels ready for discharge and to manage on own with home exercises     Plan: Discharge PT     Goals:  Resolved       PT Problem       PT Goal  (Met)       Start:  06/05/25    Expected End:  07/08/25    Resolved:  07/08/25    Pt to demo understanding of posture alignment  and demo ability to move left arm without hiking at shoulder and shows ability to move left shoulder without causing pain to the neck. Goal met    Patient to be independent with home exercises for cervical and scapular area to increase ROM and stabilization for increased function. Goal met    Patient to demonstrate awareness of neutral alignment for posture and understand strategies to use for sleeping, standing , sitting and functional activities to reduce symptoms and / or radiculopathy. Goal met             PT Goal  (Met)       Start:  06/05/25    Expected End:  07/21/25    Resolved:  07/21/25    Pt to have 50% less pain in neck and left shoulder. Goal met    Patient to demonstrate increased strength by 1/3 MMT grade in areas of weakness for increased function and reduced pain. Goal met    Patient to have Quickdash of 4 points or more improvement for increased functional mobility  . Goal Met

## 2025-07-24 ENCOUNTER — APPOINTMENT (OUTPATIENT)
Dept: PRIMARY CARE | Facility: CLINIC | Age: 70
End: 2025-07-24
Payer: MEDICARE

## 2025-07-24 VITALS
WEIGHT: 120 LBS | TEMPERATURE: 97.4 F | DIASTOLIC BLOOD PRESSURE: 78 MMHG | HEART RATE: 74 BPM | SYSTOLIC BLOOD PRESSURE: 122 MMHG | BODY MASS INDEX: 21.26 KG/M2 | OXYGEN SATURATION: 98 %

## 2025-07-24 DIAGNOSIS — R07.9 CHEST PAIN, UNSPECIFIED TYPE: Primary | ICD-10-CM

## 2025-07-24 DIAGNOSIS — F41.9 ANXIETY: ICD-10-CM

## 2025-07-24 PROCEDURE — 99214 OFFICE O/P EST MOD 30 MIN: CPT | Performed by: FAMILY MEDICINE

## 2025-07-24 PROCEDURE — 1159F MED LIST DOCD IN RCRD: CPT | Performed by: FAMILY MEDICINE

## 2025-07-24 PROCEDURE — 1036F TOBACCO NON-USER: CPT | Performed by: FAMILY MEDICINE

## 2025-07-24 PROCEDURE — G2211 COMPLEX E/M VISIT ADD ON: HCPCS | Performed by: FAMILY MEDICINE

## 2025-07-24 RX ORDER — HYDROXYZINE HYDROCHLORIDE 10 MG/1
10 TABLET, FILM COATED ORAL 2 TIMES DAILY PRN
Qty: 30 TABLET | Refills: 1 | Status: SHIPPED | OUTPATIENT
Start: 2025-07-24

## 2025-07-24 RX ORDER — DORZOLAMIDE HYDROCHLORIDE AND TIMOLOL MALEATE PRESERVATIVE FREE 20; 5 MG/ML; MG/ML
1 SOLUTION/ DROPS OPHTHALMIC 2 TIMES DAILY
COMMUNITY
Start: 2025-07-17

## 2025-07-24 RX ORDER — SERTRALINE HYDROCHLORIDE 25 MG/1
25 TABLET, FILM COATED ORAL DAILY
Qty: 30 TABLET | Refills: 1 | Status: SHIPPED | OUTPATIENT
Start: 2025-07-24 | End: 2025-09-22

## 2025-07-24 ASSESSMENT — ENCOUNTER SYMPTOMS
HEADACHES: 0
ABDOMINAL PAIN: 0
FATIGUE: 0
DIZZINESS: 0
NAUSEA: 0
DIFFICULTY URINATING: 0
SLEEP DISTURBANCE: 0
PALPITATIONS: 0
MYALGIAS: 0
DYSPHORIC MOOD: 0
DYSURIA: 0
VOMITING: 0
SHORTNESS OF BREATH: 0

## 2025-07-24 NOTE — PROGRESS NOTES
Subjective   Patient ID: Tere Rod is a 69 y.o. female who presents for Hospital Follow-up ([facility] on 7/16/25 Re: Chest Pain) and multiple issues.     HPI   CP: went to ER for sudden onset CP w/ radiation down left arm w/ some flushing. Troponin neg x2. Pt discharged for outpt stress echo. No acute findings or evidence of ischemia.     Mood: feels worsening anxiety recently. Prn hydroxy has helped. Was on prozac in past for anxiety. Helped. Believes anxiety contributed to CP. Had some CP while feeling nervous driving to apt. Under some stress.     Review of Systems   Constitutional:  Negative for fatigue.   Eyes:  Negative for visual disturbance.   Respiratory:  Negative for shortness of breath.    Cardiovascular:  Negative for palpitations.   Gastrointestinal:  Negative for abdominal pain, nausea and vomiting.   Genitourinary:  Negative for difficulty urinating and dysuria.   Musculoskeletal:  Negative for myalgias.   Skin:  Negative for rash.   Neurological:  Negative for dizziness and headaches.   Psychiatric/Behavioral:  Negative for dysphoric mood and sleep disturbance.        Objective   /78   Pulse 74   Temp 36.3 °C (97.4 °F)   Wt 54.4 kg (120 lb)   SpO2 98%   BMI 21.26 kg/m²     Physical Exam  Vitals and nursing note reviewed.   Constitutional:       General: She is not in acute distress.     Appearance: Normal appearance. She is not toxic-appearing.   HENT:      Head: Normocephalic.      Mouth/Throat:      Pharynx: Oropharynx is clear.     Eyes:      General: No scleral icterus.     Pupils: Pupils are equal, round, and reactive to light.       Cardiovascular:      Rate and Rhythm: Normal rate and regular rhythm.      Heart sounds: No murmur heard.  Pulmonary:      Effort: Pulmonary effort is normal. No respiratory distress.      Breath sounds: Normal breath sounds.   Abdominal:      General: Bowel sounds are normal.     Musculoskeletal:         General: No tenderness.      Right lower leg: No  edema.      Left lower leg: No edema.     Skin:     General: Skin is warm.     Neurological:      General: No focal deficit present.      Mental Status: She is alert.      Cranial Nerves: No cranial nerve deficit.     Psychiatric:         Mood and Affect: Mood normal.         Assessment/Plan   Problem List Items Addressed This Visit    None  Visit Diagnoses         Codes      Chest pain, unspecified type    -  Primary R07.9      Anxiety     F41.9    Relevant Medications    sertraline (Zoloft) 25 mg tablet    hydrOXYzine HCL (Atarax) 10 mg tablet        Reviewed Er reports and bw including troponin, CBC,prior TSH, imaging. Start SSRI. Discussed side effects. Recommendations given

## 2025-08-25 ENCOUNTER — APPOINTMENT (OUTPATIENT)
Dept: PRIMARY CARE | Facility: CLINIC | Age: 70
End: 2025-08-25
Payer: MEDICARE

## 2025-08-25 VITALS
SYSTOLIC BLOOD PRESSURE: 120 MMHG | HEART RATE: 72 BPM | BODY MASS INDEX: 21.97 KG/M2 | OXYGEN SATURATION: 98 % | WEIGHT: 124 LBS | DIASTOLIC BLOOD PRESSURE: 72 MMHG | TEMPERATURE: 97.9 F

## 2025-08-25 DIAGNOSIS — F41.9 ANXIETY: ICD-10-CM

## 2025-08-25 PROCEDURE — 1159F MED LIST DOCD IN RCRD: CPT | Performed by: FAMILY MEDICINE

## 2025-08-25 PROCEDURE — 99212 OFFICE O/P EST SF 10 MIN: CPT | Performed by: FAMILY MEDICINE

## 2025-08-25 PROCEDURE — 1160F RVW MEDS BY RX/DR IN RCRD: CPT | Performed by: FAMILY MEDICINE

## 2025-08-25 PROCEDURE — G2211 COMPLEX E/M VISIT ADD ON: HCPCS | Performed by: FAMILY MEDICINE

## 2025-08-25 PROCEDURE — 1036F TOBACCO NON-USER: CPT | Performed by: FAMILY MEDICINE

## 2025-08-25 RX ORDER — ERYTHROMYCIN 5 MG/G
OINTMENT OPHTHALMIC
COMMUNITY
Start: 2025-08-22

## 2025-08-25 RX ORDER — SERTRALINE HYDROCHLORIDE 25 MG/1
25 TABLET, FILM COATED ORAL DAILY
Qty: 30 TABLET | Refills: 1 | Status: CANCELLED | OUTPATIENT
Start: 2025-08-25 | End: 2025-10-24

## 2025-08-25 RX ORDER — HYDROXYZINE HYDROCHLORIDE 10 MG/1
10 TABLET, FILM COATED ORAL 2 TIMES DAILY PRN
Qty: 30 TABLET | Refills: 1 | Status: CANCELLED | OUTPATIENT
Start: 2025-08-25

## 2025-08-25 ASSESSMENT — ENCOUNTER SYMPTOMS
DYSURIA: 0
HEADACHES: 0
DYSPHORIC MOOD: 0
CONSTIPATION: 0
SHORTNESS OF BREATH: 0
NAUSEA: 0
MYALGIAS: 0
DIZZINESS: 0
ABDOMINAL PAIN: 0
VOMITING: 0
PALPITATIONS: 0
FATIGUE: 0
DIARRHEA: 0
SLEEP DISTURBANCE: 0

## 2025-08-29 ENCOUNTER — TELEPHONE (OUTPATIENT)
Dept: PRIMARY CARE | Facility: CLINIC | Age: 70
End: 2025-08-29
Payer: MEDICARE

## 2025-09-01 ENCOUNTER — APPOINTMENT (OUTPATIENT)
Dept: CARDIOLOGY | Facility: HOSPITAL | Age: 70
End: 2025-09-01
Payer: MEDICARE

## 2025-09-01 ENCOUNTER — HOSPITAL ENCOUNTER (EMERGENCY)
Facility: HOSPITAL | Age: 70
Discharge: HOME | End: 2025-09-01
Attending: STUDENT IN AN ORGANIZED HEALTH CARE EDUCATION/TRAINING PROGRAM
Payer: MEDICARE

## 2025-09-01 VITALS
HEART RATE: 78 BPM | TEMPERATURE: 98.1 F | RESPIRATION RATE: 17 BRPM | HEIGHT: 63 IN | DIASTOLIC BLOOD PRESSURE: 74 MMHG | WEIGHT: 122 LBS | SYSTOLIC BLOOD PRESSURE: 139 MMHG | OXYGEN SATURATION: 99 % | BODY MASS INDEX: 21.62 KG/M2

## 2025-09-01 DIAGNOSIS — R10.84 GENERALIZED ABDOMINAL PAIN: Primary | ICD-10-CM

## 2025-09-01 DIAGNOSIS — T50.905A MEDICATION SIDE EFFECT, INITIAL ENCOUNTER: ICD-10-CM

## 2025-09-01 LAB
ALBUMIN SERPL BCP-MCNC: 4.3 G/DL (ref 3.4–5)
ALP SERPL-CCNC: 53 U/L (ref 33–136)
ALT SERPL W P-5'-P-CCNC: 14 U/L (ref 7–45)
ANION GAP SERPL CALC-SCNC: 12 MMOL/L (ref 10–20)
APPEARANCE UR: CLEAR
AST SERPL W P-5'-P-CCNC: 14 U/L (ref 9–39)
BASOPHILS # BLD AUTO: 0.03 X10*3/UL (ref 0–0.1)
BASOPHILS NFR BLD AUTO: 0.5 %
BILIRUB SERPL-MCNC: 0.8 MG/DL (ref 0–1.2)
BILIRUB UR STRIP.AUTO-MCNC: NEGATIVE MG/DL
BUN SERPL-MCNC: 10 MG/DL (ref 6–23)
CALCIUM SERPL-MCNC: 9.3 MG/DL (ref 8.6–10.3)
CARDIAC TROPONIN I PNL SERPL HS: 6 NG/L (ref 0–13)
CHLORIDE SERPL-SCNC: 106 MMOL/L (ref 98–107)
CO2 SERPL-SCNC: 26 MMOL/L (ref 21–32)
COLOR UR: ABNORMAL
CREAT SERPL-MCNC: 0.73 MG/DL (ref 0.5–1.05)
EGFRCR SERPLBLD CKD-EPI 2021: 88 ML/MIN/1.73M*2
EOSINOPHIL # BLD AUTO: 0.02 X10*3/UL (ref 0–0.4)
EOSINOPHIL NFR BLD AUTO: 0.3 %
ERYTHROCYTE [DISTWIDTH] IN BLOOD BY AUTOMATED COUNT: 12.1 % (ref 11.5–14.5)
GLUCOSE SERPL-MCNC: 102 MG/DL (ref 74–99)
GLUCOSE UR STRIP.AUTO-MCNC: NORMAL MG/DL
HCT VFR BLD AUTO: 42.9 % (ref 36–46)
HGB BLD-MCNC: 14.6 G/DL (ref 12–16)
IMM GRANULOCYTES # BLD AUTO: 0.01 X10*3/UL (ref 0–0.5)
IMM GRANULOCYTES NFR BLD AUTO: 0.2 % (ref 0–0.9)
KETONES UR STRIP.AUTO-MCNC: NEGATIVE MG/DL
LACTATE SERPL-SCNC: 0.7 MMOL/L (ref 0.4–2)
LEUKOCYTE ESTERASE UR QL STRIP.AUTO: NEGATIVE
LIPASE SERPL-CCNC: 49 U/L (ref 9–82)
LYMPHOCYTES # BLD AUTO: 1.59 X10*3/UL (ref 0.8–3)
LYMPHOCYTES NFR BLD AUTO: 27.4 %
MCH RBC QN AUTO: 30.4 PG (ref 26–34)
MCHC RBC AUTO-ENTMCNC: 34 G/DL (ref 32–36)
MCV RBC AUTO: 89 FL (ref 80–100)
MONOCYTES # BLD AUTO: 0.49 X10*3/UL (ref 0.05–0.8)
MONOCYTES NFR BLD AUTO: 8.4 %
MUCOUS THREADS #/AREA URNS AUTO: NORMAL /LPF
NEUTROPHILS # BLD AUTO: 3.67 X10*3/UL (ref 1.6–5.5)
NEUTROPHILS NFR BLD AUTO: 63.2 %
NITRITE UR QL STRIP.AUTO: NEGATIVE
NRBC BLD-RTO: 0 /100 WBCS (ref 0–0)
PH UR STRIP.AUTO: 6.5 [PH]
PLATELET # BLD AUTO: 282 X10*3/UL (ref 150–450)
POTASSIUM SERPL-SCNC: 3.9 MMOL/L (ref 3.5–5.3)
PROT SERPL-MCNC: 7.2 G/DL (ref 6.4–8.2)
PROT UR STRIP.AUTO-MCNC: NEGATIVE MG/DL
RBC # BLD AUTO: 4.8 X10*6/UL (ref 4–5.2)
RBC # UR STRIP.AUTO: ABNORMAL MG/DL
RBC #/AREA URNS AUTO: NORMAL /HPF
SODIUM SERPL-SCNC: 140 MMOL/L (ref 136–145)
SP GR UR STRIP.AUTO: 1.01
SQUAMOUS #/AREA URNS AUTO: NORMAL /HPF
UROBILINOGEN UR STRIP.AUTO-MCNC: NORMAL MG/DL
WBC # BLD AUTO: 5.8 X10*3/UL (ref 4.4–11.3)
WBC #/AREA URNS AUTO: NORMAL /HPF

## 2025-09-01 PROCEDURE — 36415 COLL VENOUS BLD VENIPUNCTURE: CPT | Performed by: STUDENT IN AN ORGANIZED HEALTH CARE EDUCATION/TRAINING PROGRAM

## 2025-09-01 PROCEDURE — 83690 ASSAY OF LIPASE: CPT | Performed by: STUDENT IN AN ORGANIZED HEALTH CARE EDUCATION/TRAINING PROGRAM

## 2025-09-01 PROCEDURE — 84075 ASSAY ALKALINE PHOSPHATASE: CPT | Performed by: STUDENT IN AN ORGANIZED HEALTH CARE EDUCATION/TRAINING PROGRAM

## 2025-09-01 PROCEDURE — 2500000001 HC RX 250 WO HCPCS SELF ADMINISTERED DRUGS (ALT 637 FOR MEDICARE OP): Performed by: STUDENT IN AN ORGANIZED HEALTH CARE EDUCATION/TRAINING PROGRAM

## 2025-09-01 PROCEDURE — 93005 ELECTROCARDIOGRAM TRACING: CPT

## 2025-09-01 PROCEDURE — 83605 ASSAY OF LACTIC ACID: CPT | Performed by: STUDENT IN AN ORGANIZED HEALTH CARE EDUCATION/TRAINING PROGRAM

## 2025-09-01 PROCEDURE — 81001 URINALYSIS AUTO W/SCOPE: CPT | Performed by: STUDENT IN AN ORGANIZED HEALTH CARE EDUCATION/TRAINING PROGRAM

## 2025-09-01 PROCEDURE — 84484 ASSAY OF TROPONIN QUANT: CPT | Performed by: STUDENT IN AN ORGANIZED HEALTH CARE EDUCATION/TRAINING PROGRAM

## 2025-09-01 PROCEDURE — 85025 COMPLETE CBC W/AUTO DIFF WBC: CPT | Performed by: STUDENT IN AN ORGANIZED HEALTH CARE EDUCATION/TRAINING PROGRAM

## 2025-09-01 PROCEDURE — 99284 EMERGENCY DEPT VISIT MOD MDM: CPT | Performed by: STUDENT IN AN ORGANIZED HEALTH CARE EDUCATION/TRAINING PROGRAM

## 2025-09-01 RX ORDER — FAMOTIDINE 20 MG/1
20 TABLET, FILM COATED ORAL ONCE
Status: COMPLETED | OUTPATIENT
Start: 2025-09-01 | End: 2025-09-01

## 2025-09-01 RX ADMIN — FAMOTIDINE 20 MG: 20 TABLET, FILM COATED ORAL at 13:54

## 2025-09-01 ASSESSMENT — LIFESTYLE VARIABLES
EVER HAD A DRINK FIRST THING IN THE MORNING TO STEADY YOUR NERVES TO GET RID OF A HANGOVER: NO
EVER FELT BAD OR GUILTY ABOUT YOUR DRINKING: NO
HAVE YOU EVER FELT YOU SHOULD CUT DOWN ON YOUR DRINKING: NO
HAVE PEOPLE ANNOYED YOU BY CRITICIZING YOUR DRINKING: NO
TOTAL SCORE: 0

## 2025-09-01 ASSESSMENT — PAIN DESCRIPTION - PAIN TYPE: TYPE: ACUTE PAIN

## 2025-09-01 ASSESSMENT — PAIN - FUNCTIONAL ASSESSMENT: PAIN_FUNCTIONAL_ASSESSMENT: 0-10

## 2025-09-01 ASSESSMENT — PAIN DESCRIPTION - LOCATION: LOCATION: ABDOMEN

## 2025-09-01 ASSESSMENT — PAIN SCALES - GENERAL: PAINLEVEL_OUTOF10: 5 - MODERATE PAIN

## 2025-09-02 ENCOUNTER — OFFICE VISIT (OUTPATIENT)
Dept: PRIMARY CARE | Facility: CLINIC | Age: 70
End: 2025-09-02
Payer: MEDICARE

## 2025-09-02 ENCOUNTER — APPOINTMENT (OUTPATIENT)
Dept: CARDIOLOGY | Facility: HOSPITAL | Age: 70
End: 2025-09-02
Payer: MEDICARE

## 2025-09-02 VITALS
HEART RATE: 79 BPM | OXYGEN SATURATION: 97 % | TEMPERATURE: 98.1 F | BODY MASS INDEX: 20.55 KG/M2 | DIASTOLIC BLOOD PRESSURE: 70 MMHG | WEIGHT: 116 LBS | SYSTOLIC BLOOD PRESSURE: 116 MMHG

## 2025-09-02 DIAGNOSIS — R82.90 URINE ABNORMALITY: ICD-10-CM

## 2025-09-02 DIAGNOSIS — F41.9 ANXIETY: Primary | ICD-10-CM

## 2025-09-02 DIAGNOSIS — R10.9 ABDOMINAL PAIN, UNSPECIFIED ABDOMINAL LOCATION: ICD-10-CM

## 2025-09-02 PROCEDURE — G2211 COMPLEX E/M VISIT ADD ON: HCPCS | Performed by: FAMILY MEDICINE

## 2025-09-02 PROCEDURE — 1160F RVW MEDS BY RX/DR IN RCRD: CPT | Performed by: FAMILY MEDICINE

## 2025-09-02 PROCEDURE — 1036F TOBACCO NON-USER: CPT | Performed by: FAMILY MEDICINE

## 2025-09-02 PROCEDURE — 1159F MED LIST DOCD IN RCRD: CPT | Performed by: FAMILY MEDICINE

## 2025-09-02 PROCEDURE — 99214 OFFICE O/P EST MOD 30 MIN: CPT | Performed by: FAMILY MEDICINE

## 2025-09-02 ASSESSMENT — ENCOUNTER SYMPTOMS
SLEEP DISTURBANCE: 0
BLOOD IN STOOL: 0
DYSPHORIC MOOD: 0
MYALGIAS: 0
VOMITING: 0
FATIGUE: 0
PALPITATIONS: 0
CONSTIPATION: 0
HEADACHES: 0
DIZZINESS: 0
DYSURIA: 0
SHORTNESS OF BREATH: 0

## 2025-09-04 LAB
ATRIAL RATE: 74 BPM
P AXIS: 23 DEGREES
PR INTERVAL: 153 MS
Q ONSET: 249 MS
QRS COUNT: 12 BEATS
QRS DURATION: 84 MS
QT INTERVAL: 408 MS
QTC CALCULATION(BAZETT): 447 MS
QTC FREDERICIA: 433 MS
R AXIS: -11 DEGREES
T AXIS: 35 DEGREES
T OFFSET: 453 MS
VENTRICULAR RATE: 72 BPM

## 2025-09-24 ENCOUNTER — APPOINTMENT (OUTPATIENT)
Dept: ENDOCRINOLOGY | Facility: CLINIC | Age: 70
End: 2025-09-24
Payer: MEDICARE

## 2025-09-25 ENCOUNTER — APPOINTMENT (OUTPATIENT)
Dept: PRIMARY CARE | Facility: CLINIC | Age: 70
End: 2025-09-25
Payer: MEDICARE

## 2025-11-20 ENCOUNTER — APPOINTMENT (OUTPATIENT)
Dept: PRIMARY CARE | Facility: CLINIC | Age: 70
End: 2025-11-20
Payer: MEDICARE